# Patient Record
Sex: MALE | Race: WHITE | NOT HISPANIC OR LATINO | Employment: FULL TIME | URBAN - METROPOLITAN AREA
[De-identification: names, ages, dates, MRNs, and addresses within clinical notes are randomized per-mention and may not be internally consistent; named-entity substitution may affect disease eponyms.]

---

## 2023-02-12 ENCOUNTER — APPOINTMENT (EMERGENCY)
Dept: RADIOLOGY | Facility: HOSPITAL | Age: 43
End: 2023-02-12

## 2023-02-12 ENCOUNTER — HOSPITAL ENCOUNTER (EMERGENCY)
Facility: HOSPITAL | Age: 43
Discharge: HOME | End: 2023-02-12
Attending: EMERGENCY MEDICINE

## 2023-02-12 VITALS
DIASTOLIC BLOOD PRESSURE: 59 MMHG | RESPIRATION RATE: 16 BRPM | OXYGEN SATURATION: 96 % | SYSTOLIC BLOOD PRESSURE: 108 MMHG | TEMPERATURE: 98.2 F | WEIGHT: 225.09 LBS | HEART RATE: 71 BPM

## 2023-02-12 DIAGNOSIS — U07.1 COVID: Primary | ICD-10-CM

## 2023-02-12 LAB
ATRIAL RATE: 72 BPM
GLUCOSE SERPL-MCNC: 125 MG/DL (ref 65–140)
P AXIS: 47 DEGREES
PR INTERVAL: 144 MS
QRS AXIS: 76 DEGREES
QRSD INTERVAL: 90 MS
QT INTERVAL: 356 MS
QTC INTERVAL: 389 MS
T WAVE AXIS: 64 DEGREES
VENTRICULAR RATE: 72 BPM

## 2023-02-12 RX ORDER — BENZONATATE 100 MG/1
100 CAPSULE ORAL 3 TIMES DAILY PRN
Qty: 20 CAPSULE | Refills: 3 | Status: SHIPPED | OUTPATIENT
Start: 2023-02-12

## 2023-02-12 RX ORDER — BENZONATATE 100 MG/1
100 CAPSULE ORAL ONCE
Status: COMPLETED | OUTPATIENT
Start: 2023-02-12 | End: 2023-02-12

## 2023-02-12 RX ORDER — ALBUTEROL SULFATE 90 UG/1
2 AEROSOL, METERED RESPIRATORY (INHALATION) ONCE
Status: COMPLETED | OUTPATIENT
Start: 2023-02-12 | End: 2023-02-12

## 2023-02-12 RX ADMIN — ALBUTEROL SULFATE 2 PUFF: 90 AEROSOL, METERED RESPIRATORY (INHALATION) at 12:25

## 2023-02-12 RX ADMIN — BENZONATATE 100 MG: 100 CAPSULE ORAL at 12:25

## 2023-02-12 NOTE — DISCHARGE INSTRUCTIONS
Diagnosis; covid     - activity as tolerated- please keep as active as can     - keep well hydrated     - for any aches/pains- fevers- temp > 100 4/- over the counter generic tylenol 500 mg every 4 hrs while awake    - there is no magic cough medication- can try  tessalon 1 capsule 3 times per day  as needed -- can also try over the counter honey  5 ml at night 1 hr before bedtime    - albuterol  inhaler 2 puffs 4 times a d ay over the next several days to week     - please return to  the er for any increasing difficulty breathing in which you can not do your daily activities because you are too short of breath

## 2023-02-12 NOTE — Clinical Note
Latrell Downing was seen and treated in our emergency department on 2/12/2023  Diagnosis:     Helen Herbert  may return to work on return date  He may return on this date: 02/20/2023         If you have any questions or concerns, please don't hesitate to call        Brenda Danielson MD    ______________________________           _______________          _______________  Hospital Representative                              Date                                Time

## 2023-02-12 NOTE — ED PROVIDER NOTES
History  Chief Complaint   Patient presents with   • Shortness of Breath     + COVID test today  53-year-old male with past medical history of hypertension and diabetes presented for evaluation of shortness of breath and chest pain  Patient stated  he was not feeling well last night, took a home COVID test which was positive  This morning when he woke up, he reported having chest pain with deep breathing and with coughing  Patient denied nausea, vomiting, diarrhea,  Fever,  urinary symptoms or any constitutional symptoms  Patient stated the pain is nonpositional , nonexertional   Patient denies any trauma history or long car ride  He currently rates the pain 3 out of 10  Patient is seen at bedside in no acute distress, not currently short of breath  None       Past Medical History:   Diagnosis Date   • Diabetes mellitus ([facility] [address] )    • Hypertension        Past Surgical History:   Procedure Laterality Date   • CERVICAL DISC SURGERY     • TONSILLECTOMY         History reviewed  No pertinent family history  I have reviewed and agree with the history as documented  E-Cigarette/Vaping   • E-Cigarette Use Never User      E-Cigarette/Vaping Substances     Social History     Tobacco Use   • Smoking status: Every Day     Packs/day: 1 00     Types: Cigarettes   • Smokeless tobacco: Never   Vaping Use   • Vaping Use: Never used   Substance Use Topics   • Alcohol use: Never   • Drug use: Never        Review of Systems   Constitutional: Negative for chills and fever  HENT: Negative for ear pain and sore throat  Eyes: Negative for pain and visual disturbance  Respiratory: Positive for cough (Nonproductive) and shortness of breath (Resolved)  Cardiovascular: Negative for chest pain and palpitations  Gastrointestinal: Negative for abdominal pain and vomiting  Genitourinary: Negative for dysuria and hematuria  Musculoskeletal: Negative for arthralgias and back pain     Skin: Negative for color change and rash  Neurological: Negative for seizures and syncope  All other systems reviewed and are negative  Physical Exam  ED Triage Vitals [02/12/23 1038]   Temperature Pulse Respirations Blood Pressure SpO2   98 2 °F (36 8 °C) 75 16 149/69 98 %      Temp Source Heart Rate Source Patient Position - Orthostatic VS BP Location FiO2 (%)   Oral Monitor Sitting Right arm --      Pain Score       --             Orthostatic Vital Signs  Vitals:    02/12/23 1038 02/12/23 1100 02/12/23 1130 02/12/23 1200   BP: 149/69 107/58 108/54 108/59   Pulse: 75 71 71 71   Patient Position - Orthostatic VS: Sitting          Physical Exam  Vitals and nursing note reviewed  Constitutional:       General: He is not in acute distress  Appearance: He is well-developed  HENT:      Head: Normocephalic and atraumatic  Eyes:      Conjunctiva/sclera: Conjunctivae normal    Cardiovascular:      Rate and Rhythm: Normal rate and regular rhythm  Heart sounds: No murmur heard  Pulmonary:      Effort: Pulmonary effort is normal  No respiratory distress  Breath sounds: Rhonchi (Mild diffused) present  Abdominal:      Palpations: Abdomen is soft  Tenderness: There is no abdominal tenderness  Musculoskeletal:         General: No swelling  Cervical back: Neck supple  Skin:     General: Skin is warm and dry  Capillary Refill: Capillary refill takes less than 2 seconds  Neurological:      Mental Status: He is alert     Psychiatric:         Mood and Affect: Mood normal          ED Medications  Medications   benzonatate (TESSALON PERLES) capsule 100 mg (100 mg Oral Given 2/12/23 1225)   albuterol (PROVENTIL HFA,VENTOLIN HFA) inhaler 2 puff (2 puffs Inhalation Given 2/12/23 1225)       Diagnostic Studies  Results Reviewed     Procedure Component Value Units Date/Time    Fingerstick Glucose (POCT) [[id number]]  (Normal) Collected: 02/12/23 1220    Lab Status: Final result Updated: 02/12/23 1221     POC Glucose 125 mg/dl                  XR chest 1 view portable   ED Interpretation by Yael Soares MD (02/12 1239)   No signs of pneumothorax, pleural effusion or consolidation  No sign of rib fracture  Normal costovertebral angle  Final Result by Mable Palacio MD (02/12 1448)      No acute cardiopulmonary disease  Workstation performed: [id number]               Procedures  ECG 12 Lead Documentation Only    Date/Time: 2/12/2023 12:10 PM  Performed by: Yael Soares MD  Authorized by: Yael Soares MD     Indications / Diagnosis:  Shortness of breath/COVID  ECG reviewed by me, the ED Provider: yes    Patient location:  ED  Previous ECG:     Previous ECG:  Unavailable    Comparison to cardiac monitor: Yes    Interpretation:     Interpretation: normal    Rate:     ECG rate:  72    ECG rate assessment: normal    Rhythm:     Rhythm: sinus rhythm    Ectopy:     Ectopy: none    QRS:     QRS axis:  Normal    QRS intervals:  Normal  ST segments:     ST segments:  Normal  T waves:     T waves: normal    Comments:      Normal ventricular rate 72 bpm, FL interval 154 ms QRS duration 90 ms  ms QTc 389 ms  No acute ST segment elevation or depression, normal EKG  Sinus rhythm  ED Course  ED Course as of 02/12/23 2013   [place] Feb 12, 2023   156 40-year-old male presented for evaluation of shortness of breath after home COVID test was positive this morning  We will order EKG  1217 EKG showed normal sinus rhythm no acute ST segment elevation or depression  [phone] Order chest x-ray   1239 Chest x-ray unremarkable  There is no sign of pleural effusion, consolidation  Normal cardiac silhouette, no hemothorax or pneumothorax  No ribs fracture  1252 Patient given Tessalon pearls as well as albuterol inhaler for  breathing treatment     1319 After discussing risk and benefit of Paxlovid with patient, patient decided not interested with Paxlovid treatment at this point  Patient will be discharged home with return precaution  Medical Decision Making  15-year-old male presented for evaluation of shortness of breath after home COVID test was positive this morning  EKG showed normal sinus rhythm no acute ST segment elevation or depression  Order chest x-ray: Chest x-ray unremarkable  There is no sign of pleural effusion, consolidation  Normal cardiac silhouette, no hemothorax or pneumothorax  No ribs fracture  Differential diagnoses include COVID, flu, RSV, pneumonia among others    Patient given Tessalon pearls for cough  as well as albuterol inhaler for  breathing treatment  After discussing risk and benefit of Paxlovid with patient, patient decided not interested with Paxlovid treatment at this point  Patient discharged home with return precautions  COVID: acute illness or injury  Amount and/or Complexity of Data Reviewed  Labs: ordered  Radiology: ordered and independent interpretation performed  Risk  Prescription drug management  Disposition  Final diagnoses:   COVID     Time reflects when diagnosis was documented in both MDM as applicable and the Disposition within this note     Time User Action Codes Description Comment    2/12/2023  1:17 PM Jeromy De Los Santos [facility]U07 1[facility]       ED Disposition     ED Disposition   Discharge    Condition   Stable    Date/Time   Sun Feb 12, 2023  1:17 PM    Comment   Indra Lees discharge to home/self care  Follow-up Information    None         Discharge Medication List as of 2/12/2023  1:25 PM      START taking these medications    Details   benzonatate (TESSALON PERLES) 100 mg capsule Take 1 capsule (100 mg total) by mouth 3 (three) times a day as needed for cough for up to 20 doses, Starting Sun 2/12/2023, Print           No discharge procedures on file      PDMP Review     None           ED Provider  Attending physically available and evaluated Lorelei Abdullahi I managed the patient along with the ED Attending      Electronically Signed by         Trung Mendoza MD  02/12/23 2013

## 2023-02-15 NOTE — ED ATTENDING ATTESTATION
2/12/2023  ISilvia MD, saw and evaluated the patient  I have discussed the patient with the resident/non-physician practitioner and agree with the resident's/non-physician practitioner's findings, Plan of Care, and MDM as documented in the resident's/non-physician practitioner's note, except where noted  All available labs and Radiology studies were reviewed  I was present for key portions of any procedure(s) performed by the resident/non-physician practitioner and I was immediately available to provide assistance  At this point I agree with the current assessment done in the Emergency Department    I have conducted an independent evaluation of this patient a history and physical is as follows:see h nd p above- agree with er resident tx plan/ dispo    ED Course  ED Course as of 02/15/23 0851   Sun Feb 12, 2023   1251 Cxr portable- compared to previous- 5/10- no sign changes- no change in mediastinum/ cardiac silhouette- no free/sq air- no infiltrate- ptx- pulm edema- pleural effusions   1315 - er md note- discussed paxlovid with pt- risks / benefits-- pt not interested  in rx at [facility] point- will d/c   1316 Er md note- cxr reviewed with pt          Critical Care Time  Procedures

## 2024-09-20 LAB — HBA1C MFR BLD HPLC: 7.1 %

## 2024-11-15 ENCOUNTER — OFFICE VISIT (OUTPATIENT)
Dept: FAMILY MEDICINE CLINIC | Facility: CLINIC | Age: 44
End: 2024-11-15
Payer: COMMERCIAL

## 2024-11-15 VITALS
HEART RATE: 70 BPM | WEIGHT: 216.6 LBS | RESPIRATION RATE: 16 BRPM | TEMPERATURE: 96.8 F | BODY MASS INDEX: 32.83 KG/M2 | HEIGHT: 68 IN | DIASTOLIC BLOOD PRESSURE: 55 MMHG | OXYGEN SATURATION: 96 % | SYSTOLIC BLOOD PRESSURE: 124 MMHG

## 2024-11-15 DIAGNOSIS — J84.9 INTERSTITIAL LUNG DISEASE (HCC): ICD-10-CM

## 2024-11-15 DIAGNOSIS — H90.3 SENSORY HEARING LOSS, BILATERAL: ICD-10-CM

## 2024-11-15 DIAGNOSIS — E11.9 TYPE 2 DIABETES MELLITUS WITHOUT COMPLICATION, WITHOUT LONG-TERM CURRENT USE OF INSULIN (HCC): ICD-10-CM

## 2024-11-15 DIAGNOSIS — M72.2 PLANTAR FASCIITIS: ICD-10-CM

## 2024-11-15 DIAGNOSIS — F32.A ANXIETY AND DEPRESSION: ICD-10-CM

## 2024-11-15 DIAGNOSIS — Z00.00 ANNUAL PHYSICAL EXAM: Primary | ICD-10-CM

## 2024-11-15 DIAGNOSIS — G56.01 CARPAL TUNNEL SYNDROME OF RIGHT WRIST: ICD-10-CM

## 2024-11-15 DIAGNOSIS — F43.21 GRIEF: ICD-10-CM

## 2024-11-15 DIAGNOSIS — F41.8 INSOMNIA SECONDARY TO DEPRESSION WITH ANXIETY: ICD-10-CM

## 2024-11-15 DIAGNOSIS — F51.05 INSOMNIA SECONDARY TO DEPRESSION WITH ANXIETY: ICD-10-CM

## 2024-11-15 DIAGNOSIS — H93.13 TINNITUS OF BOTH EARS: ICD-10-CM

## 2024-11-15 DIAGNOSIS — E78.5 HYPERLIPIDEMIA, UNSPECIFIED HYPERLIPIDEMIA TYPE: ICD-10-CM

## 2024-11-15 DIAGNOSIS — F41.9 ANXIETY AND DEPRESSION: ICD-10-CM

## 2024-11-15 DIAGNOSIS — I10 ESSENTIAL HYPERTENSION: ICD-10-CM

## 2024-11-15 DIAGNOSIS — J43.8 PARASEPTAL EMPHYSEMA (HCC): ICD-10-CM

## 2024-11-15 PROBLEM — J44.9 COPD (CHRONIC OBSTRUCTIVE PULMONARY DISEASE) (HCC): Status: ACTIVE | Noted: 2024-03-13

## 2024-11-15 PROBLEM — M54.12 CERVICAL RADICULOPATHY: Status: ACTIVE | Noted: 2021-02-09

## 2024-11-15 PROBLEM — M54.41 ACUTE RIGHT-SIDED LOW BACK PAIN WITH RIGHT-SIDED SCIATICA: Status: ACTIVE | Noted: 2021-10-21

## 2024-11-15 PROCEDURE — 99204 OFFICE O/P NEW MOD 45 MIN: CPT | Performed by: FAMILY MEDICINE

## 2024-11-15 PROCEDURE — 99386 PREV VISIT NEW AGE 40-64: CPT | Performed by: FAMILY MEDICINE

## 2024-11-15 RX ORDER — MOMETASONE FUROATE 1 MG/ML
SOLUTION TOPICAL DAILY
COMMUNITY
Start: 2024-01-04 | End: 2025-01-03

## 2024-11-15 RX ORDER — ALBUTEROL SULFATE 0.83 MG/ML
2.5 SOLUTION RESPIRATORY (INHALATION) EVERY 6 HOURS PRN
COMMUNITY
Start: 2024-04-22 | End: 2025-04-22

## 2024-11-15 RX ORDER — ALBUTEROL SULFATE 90 UG/1
2 INHALANT RESPIRATORY (INHALATION) EVERY 6 HOURS PRN
COMMUNITY
Start: 2024-09-20

## 2024-11-15 RX ORDER — FLUTICASONE PROPIONATE 50 MCG
2 SPRAY, SUSPENSION (ML) NASAL DAILY
COMMUNITY
Start: 2024-10-25

## 2024-11-15 RX ORDER — LISINOPRIL AND HYDROCHLOROTHIAZIDE 12.5; 2 MG/1; MG/1
1 TABLET ORAL DAILY
Qty: 90 TABLET | Refills: 2 | Status: SHIPPED | OUTPATIENT
Start: 2024-11-15

## 2024-11-15 RX ORDER — ATORVASTATIN CALCIUM 40 MG/1
40 TABLET, FILM COATED ORAL DAILY
Qty: 90 TABLET | Refills: 2 | Status: SHIPPED | OUTPATIENT
Start: 2024-11-15

## 2024-11-15 RX ORDER — LISINOPRIL AND HYDROCHLOROTHIAZIDE 12.5; 2 MG/1; MG/1
1 TABLET ORAL DAILY
COMMUNITY
Start: 2024-08-28 | End: 2024-11-15 | Stop reason: SDUPTHER

## 2024-11-15 RX ORDER — BUPROPION HYDROCHLORIDE 150 MG/1
150 TABLET ORAL EVERY MORNING
Qty: 30 TABLET | Refills: 0 | Status: SHIPPED | OUTPATIENT
Start: 2024-11-15 | End: 2025-05-14

## 2024-11-15 RX ORDER — ATORVASTATIN CALCIUM 40 MG/1
40 TABLET, FILM COATED ORAL
COMMUNITY
Start: 2024-05-29 | End: 2024-11-15 | Stop reason: SDUPTHER

## 2024-11-15 NOTE — ASSESSMENT & PLAN NOTE
Lab Results   Component Value Date    HGBA1C 6.8 (H) 03/12/2024       Orders:    metFORMIN (GLUCOPHAGE) 500 mg tablet; Take 1 tablet (500 mg total) by mouth 2 (two) times a day with meals

## 2024-11-15 NOTE — ASSESSMENT & PLAN NOTE
Orders:    lisinopril-hydrochlorothiazide (PRINZIDE,ZESTORETIC) 20-12.5 MG per tablet; Take 1 tablet by mouth daily

## 2024-11-15 NOTE — PROGRESS NOTES
Adult Annual Physical  Name: Efrain Fair      : 1980      MRN: [id number]  Encounter Provider: Vikram Monroe MD  Encounter Date: 11/15/2024   Encounter department: [facility]    Efrain is a pleasant 43-year-old with type 2 diabetes, interstitial lung disease, COPD, hypertension, hyperlipidemia, hearing loss, tinnitus, chronic tobacco use, and chronic pain he has new patient with several concerns.  Patient unfortunately lost his father to complications of COPD and Ge[place]te.  He is grieving appropriately.  Does report feeling depressed.  He declined grief counseling.  We discussed medications to help his mood as well as reduce his smoking and help manage his weight.  We also addressed his chronic pain for which he tolerates.  I refilled his medications.  He is due for diabetic foot and eye exam which she will perform at the next appointment.  Last A1c was 6.8 and currently on metformin 500 mg twice daily.  Counseled on diet and exercise.  States he was able to lose weight in the past with lifestyle changes.  I refilled his medications including his blood pressure med which is managing his BP based on today's reading.  He has an appointment to establish with a new pulmonologist and denies any respiratory complaints.  Lastly I referred him to audiology for formal hearing evaluation through [facility].  States he had 1 with [facility] and was told that he had hearing impairment but did not have any follow-up after the initial evaluation.  Return to clinic in 6 to 8 weeks to reassess mood and to follow-up on diabetes.  Assessment & Plan  Annual physical exam         Essential hypertension    Orders:    lisinopril-hydrochlorothiazide (PRINZIDE,ZESTORETIC) 20-12.5 MG per tablet; Take 1 tablet by mouth daily    Hyperlipidemia, unspecified hyperlipidemia type    Orders:    atorvastatin (LIPITOR) 40 mg tablet; Take 1 tablet (40 mg total) by mouth daily    Type 2 diabetes mellitus without  complication, without long-term current use of insulin (HCC)    Lab Results   Component Value Date    HGBA1C 6.8 (H) 03/12/2024       Orders:    metFORMIN (GLUCOPHAGE) 500 mg tablet; Take 1 tablet (500 mg total) by mouth 2 (two) times a day with meals    Interstitial lung disease (HCC)         Paraseptal emphysema (HCC)         Carpal tunnel syndrome of right wrist         Plantar fasciitis         Sensory hearing loss, bilateral    Orders:    Ambulatory Referral to Audiology; Future    Tinnitus of both ears    Orders:    Ambulatory Referral to Audiology; Future    Grief         Anxiety and depression           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 Appropriate education was printed on patient's after visit summary.    Counseling:  Dental Health: discussed importance of regular tooth brushing, flossing, and dental visits.  Injury prevention: discussed safety/seat belts, safety helmets, smoke detectors, carbon monoxide detectors, and smoking near bedding or upholstery.  Exercise: the importance of regular exercise/physical activity was discussed. Recommend exercise 3-5 times per week for at least 30 minutes.       Depression Screening and Follow-up Plan: Patient was screened for depression during today's encounter. They screened negative with a PHQ-2 score of 2.    Tobacco Cessation Counseling: Tobacco cessation counseling was provided. The patient is sincerely urged to quit consumption of tobacco. He is not ready to quit tobacco. Medication options and side effects of medication discussed. Patient agreed to medication. Wellbutrin      History of Present Illness     Adult Annual Physical:  Patient presents for annual physical. New patient   Was seeing a provider through [facility] unfortunately passed  away this June   Reports arthritis in the right knee, neck, and back  Also noted to have degenerative changes in the spine  Traumatic injury to the right leg when he was 1 years old   Tightness and pain in the right lateral leg has affected his gait  .     Diet and Physical Activity:  - Diet/Nutrition:. acceptable but does snack and is emotionally eating  - Exercise: no formal exercise.    Depression Screening:  - PHQ-2 Score: 2    General Health:  - Sleep: sleeps well.  - Hearing: decreased hearing bilateral ears.  - Vision: wears glasses.  - Dental: regular dental visits. October     Health:  - History of STDs: no.   - Urinary symptoms: none.     Review of Systems  Medical History Reviewed by provider this encounter:  Tobacco     .  Past Medical History   Past Medical History:   Diagnosis Date    Diabetes mellitus (HCC)     Hypertension      Past Surgical History:   Procedure Laterality Date    CERVICAL DISC SURGERY  01/08/2000    TONSILLECTOMY       Family History   Problem Relation Age of Onset    Hypertension Mother     Diabetes Mother     Breast cancer Mother     Hypertension Father     Heart attack Father     Diabetes Father       reports that he has been smoking cigarettes. He has never used smokeless tobacco. He reports that he does not drink alcohol and does not use drugs.  Current Outpatient Medications on File Prior to Visit   Medication Sig Dispense Refill    albuterol (2.5 mg/3 mL) 0.083 % nebulizer solution Inhale 2.5 mg every 6 (six) hours as needed      albuterol (PROVENTIL HFA,VENTOLIN HFA) 90 mcg/act inhaler Inhale 2 puffs every 6 (six) hours as needed      fluticasone (FLONASE) 50 mcg/act nasal spray 2 sprays into each nostril daily      mometasone (ELOCON) 0.1 % lotion Apply topically daily       No current facility-administered medications on file prior to visit.     Allergies   Allergen Reactions    Penicillins Other (See Comments)     unknown      Current Outpatient Medications on File  "Prior to Visit   Medication Sig Dispense Refill    albuterol (2.5 mg/3 mL) 0.083 % nebulizer solution Inhale 2.5 mg every 6 (six) hours as needed      albuterol (PROVENTIL HFA,VENTOLIN HFA) 90 mcg/act inhaler Inhale 2 puffs every 6 (six) hours as needed      fluticasone (FLONASE) 50 mcg/act nasal spray 2 sprays into each nostril daily      mometasone (ELOCON) 0.1 % lotion Apply topically daily       No current facility-administered medications on file prior to visit.      Social History     Tobacco Use    Smoking status: Every Day     Current packs/day: 1.00     Types: Cigarettes    Smokeless tobacco: Never   Vaping Use    Vaping status: Never Used   Substance and Sexual Activity    Alcohol use: Never    Drug use: Never    Sexual activity: Not Currently       Objective   /55 (BP Location: Left arm, Patient Position: Sitting, Cuff Size: Large)   Pulse 70   Temp (!) 96.8 °F (36 °C) (Tympanic)   Resp 16   Ht 5' 8\" (1.727 m)   Wt 98.2 kg (216 lb 9.6 oz)   SpO2 96%   BMI 32.93 kg/m²     Physical Exam  Vitals reviewed.   Constitutional:       General: He is not in acute distress.     Appearance: Normal appearance. He is not ill-appearing or toxic-appearing.   HENT:      Head: Normocephalic and atraumatic.      Right Ear: Tympanic membrane normal.      Left Ear: Tympanic membrane normal.      Mouth/Throat:      Mouth: Mucous membranes are moist.   Eyes:      Extraocular Movements: Extraocular movements intact.   Cardiovascular:      Rate and Rhythm: Normal rate and regular rhythm.      Heart sounds: No murmur heard.  Pulmonary:      Effort: Pulmonary effort is normal. No respiratory distress.      Breath sounds: Normal breath sounds. No stridor. No wheezing, rhonchi or rales.   Abdominal:      General: There is no distension.      Palpations: Abdomen is soft. There is no mass.      Tenderness: There is no abdominal tenderness. There is no guarding or rebound.      Hernia: No hernia is present. "   Musculoskeletal:      Right lower leg: No edema.      Left lower leg: No edema.   Lymphadenopathy:      Cervical: No cervical adenopathy.   Skin:     General: Skin is warm.   Neurological:      Mental Status: He is alert and oriented to person, place, and time.   Psychiatric:         Attention and Perception: Attention normal.         Mood and Affect: Mood normal. Affect is flat.         Behavior: Behavior is withdrawn. Behavior is cooperative.         Cognition and Memory: Cognition and memory normal.     Administrative Statements   I have spent a total time of 45 minutes in caring for this patient on the day of the visit/encounter including Diagnostic results, Risks and benefits of tx options, Instructions for management, Patient and family education, Importance of tx compliance, Risk factor reductions, Impressions, Counseling / Coordination of care, Documenting in the medical record, Reviewing / ordering tests, medicine, procedures  , and Obtaining or reviewing history  .

## 2024-11-15 NOTE — PATIENT INSTRUCTIONS
Patient Education     Semaglutide (radha a GLOO tide)   Brand Names: US Ozempic (0.25 or 0.5 MG/DOSE); Ozempic (1 MG/DOSE); Ozempic (2 MG/DOSE); Rybelsus; Wegovy   Brand Names: Jae Ozempic (0.25 or 0.5 MG/DOSE); Ozempic (1 MG/DOSE); Rybelsus; Wegovy   Warning   This drug has been shown to cause thyroid cancer in some animals. It is not known if this happens in humans. If thyroid cancer happens, it may be deadly if not found and treated early. Call your doctor right away if you have a neck mass, trouble breathing, trouble swallowing, or have hoarseness that will not go away.  Do not use this drug if you have a health problem called Multiple Endocrine Neoplasia syndrome type 2 (MEN 2), or if you or a family member have had thyroid cancer.  What is this drug used for?   Ozempic prefilled pens and Rybelsus tablets:   It is used to lower blood sugar in people with type 2 diabetes.  Ozempic prefilled pens:   It is used to lower the chance of heart attack, stroke, and death in people with type 2 diabetes and heart disease.  Wegovy prefilled pens:   It is used to help with weight loss in certain people.  It is used to lower the chance of heart attack, stroke, and death in people who are overweight and have heart disease.  What do I need to tell my doctor BEFORE I take this drug?   For all uses of this drug:   If you are allergic to this drug; any part of this drug; or any other drugs, foods, or substances. Tell your doctor about the allergy and what signs you had.  If you have ever had pancreatitis.  If you have or have ever had depression or thoughts of suicide.  If you are using another drug that has the same drug in it.  If you are using another drug like this one. If you are not sure, ask your doctor or pharmacist.  If using for high blood sugar:   If you have type 1 diabetes. Do not use this drug to treat type 1 diabetes.  Tablets:   If you are breast-feeding. Do not breast-feed while you take this drug.  This is not  a list of all drugs or health problems that interact with this drug.  Tell your doctor and pharmacist about all of your drugs (prescription or OTC, natural products, vitamins) and health problems. You must check to make sure that it is safe for you to take this drug with all of your drugs and health problems. Do not start, stop, or change the dose of any drug without checking with your doctor.  What are some things I need to know or do while I take this drug?   For all uses of this drug:   Tell all of your health care providers that you take this drug. This includes your doctors, nurses, pharmacists, and dentists.  Follow the diet and workout plan that your doctor told you about.  Have blood work checked as you have been told by the doctor. Talk with the doctor.  Talk with your doctor before you drink alcohol.  Kidney problems have happened. Sometimes, these may need to be treated in the hospital or with dialysis.  If you cannot drink liquids by mouth or if you have upset stomach, throwing up, or diarrhea that does not go away, you need to avoid getting dehydrated. Contact your doctor to find out what to do. Dehydration may lead to low blood pressure or to new or worsening kidney problems.  Do not share pen or cartridge devices with another person even if the needle has been changed. Sharing these devices may pass infections from one person to another. This includes infections you may not know you have.  If you are planning on getting pregnant, talk with your doctor. You may need to stop taking this drug at least 2 months before getting pregnant.  If using for high blood sugar:   Wear disease medical alert ID (identification).  Check your blood sugar as you have been told by your doctor.  Do not drive if your blood sugar has been low. There is a greater chance of you having a crash.  People taking this drug with other drugs for diabetes may have a raised risk of low blood sugar. Very low blood sugar can lead to  seizures, passing out, long lasting brain damage, and sometimes death. Talk with the doctor.  It may be harder to control blood sugar during times of stress such as fever, infection, injury, or surgery. A change in physical activity, exercise, or diet may also affect blood sugar.  Tell your doctor if you are pregnant or may be pregnant.  Ozempic prefilled pens:   Tell your doctor if you are breast-feeding. You will need to talk about any risks to your baby.  Wegovy prefilled pens:   If you have high blood sugar (diabetes), you will need to watch your blood sugar closely.  If you are 75 or older, use this drug with care. You may have a raised chance of hip or pelvis fracture.  Weight loss during pregnancy may cause harm to the unborn baby. If you get pregnant while taking this drug or if you want to get pregnant, call your doctor right away.  Tell your doctor if you are breast-feeding. You will need to talk about any risks to your baby.  What are some side effects that I need to call my doctor about right away?   WARNING/CAUTION: Even though it may be rare, some people may have very bad and sometimes deadly side effects when taking a drug. Tell your doctor or get medical help right away if you have any of the following signs or symptoms that may be related to a very bad side effect:  For all uses of this drug:   Signs of an allergic reaction, like rash; hives; itching; red, swollen, blistered, or peeling skin with or without fever; wheezing; tightness in the chest or throat; trouble breathing, swallowing, or talking; unusual hoarseness; or swelling of the mouth, face, lips, tongue, or throat.  Signs of kidney problems like unable to pass urine, change in how much urine is passed, blood in the urine, or a big weight gain.  Signs of gallbladder problems like pain in the upper right belly area, right shoulder area, or between the shoulder blades; change in stools; dark urine or yellow skin or eyes; or fever with  chills.  Severe dizziness or passing out.  A fast heartbeat.  Change in eyesight.  Low blood sugar can happen. The chance may be raised when this drug is used with other drugs for diabetes. Signs may be dizziness, headache, feeling sleepy or weak, shaking, fast heartbeat, confusion, hunger, or sweating. Call your doctor right away if you have any of these signs. Follow what you have been told to do for low blood sugar. This may include taking glucose tablets, liquid glucose, or some fruit juices.  Severe and sometimes deadly pancreas problems (pancreatitis) have happened with this drug. Call your doctor right away if you have severe stomach pain, severe back pain, or severe upset stomach or throwing up.  Wegovy prefilled pens:   New or worse behavior or mood changes like depression or thoughts of suicide.  Pain in the hip or pelvis.  What are some other side effects of this drug?   All drugs may cause side effects. However, many people have no side effects or only have minor side effects. Call your doctor or get medical help if any of these side effects or any other side effects bother you or do not go away:  If using for high blood sugar:   Constipation, diarrhea, stomach pain, upset stomach, or throwing up.  Tablets:   Decreased appetite.  Wegovy prefilled pens:   Constipation, diarrhea, stomach pain, upset stomach, or throwing up.  Headache.  Feeling dizzy, tired, or weak.  Nose or throat irritation.  Runny nose.  Bloating.  Burping.  Gas.  Heartburn.  These are not all of the side effects that may occur. If you have questions about side effects, call your doctor. Call your doctor for medical advice about side effects.  You may report side effects to your national health agency.  You may report side effects to the FDA at 1-205.796.9631. You may also report side effects at https://www.fda.gov/medwatch.  How is this drug best taken?   Use this drug as ordered by your doctor. Read all information given to you.  Follow all instructions closely.  Tablets:   Take at least 30 minutes before the first food, drink, or drugs of the day.  Take with plain water only. Do not take with more than 4 ounces (120 mL) of water.  Swallow whole. Do not chew, break, or crush.  Keep taking this drug as you have been told by your doctor or other health care provider, even if you feel well.  Prefilled syringes or pens:   It is given as a shot into the fatty part of the skin on the top of the thigh, belly area, or upper arm.  If you will be giving yourself the shot, your doctor or nurse will teach you how to give the shot.  Take with or without food.  Take the same day each week.  Move the site where you give the shot with each shot.  Do not use if the solution is cloudy, leaking, or has particles.  This drug is clear and colorless. Do not use if the solution changes color.  Wash your hands before and after use.  Remove all pen needle covers before injecting a dose (there may be 2). If you are not sure what type of pen needle you have or how to use it, talk with the doctor.  Keep taking this drug as you have been told by your doctor or other health care provider, even if you feel well.  Throw away needles in a needle/sharp disposal box. Do not reuse needles or other items. When the box is full, follow all local rules for getting rid of it. Talk with a doctor or pharmacist if you have any questions.  If using for high blood sugar:   Attach new needle before each dose.  Take off the needle after each shot. Do not store this device with the needle on it.  Put the cap back on after you are done using your dose.  If you are also using insulin, you may inject this drug and the insulin in the same area of the body but not right next to each other.  Do not mix this drug in the same syringe with insulin.  This product may make a clicking sound as you prepare the dose. Do not prepare the dose by counting the clicks. Doing so could lead to using the wrong  dose.  Wegovy prefilled pens:   Each container is for one use only. Use right after opening. Throw away any part of the opened container after the dose is given.  Do not use this drug if it has been dropped or if it is broken.  Do not get this product wet.  What do I do if I miss a dose?   Tablets:   Skip the missed dose and go back to your normal time.  Do not take 2 doses at the same time or extra doses.  Prefilled syringes or pens:   Take a missed dose as soon as you think about it and go back to your normal time.  If it is less than 48 hours until your next dose, skip the missed and go back to your normal time.  Do not take 2 doses within 48 hours of each other.  If you miss 2 doses, call your doctor.  How do I store and/or throw out this drug?   Tablets:   Store in the original container at room temperature.  Store in a dry place. Do not store in a bathroom.  Prefilled syringes or pens:   Store unopened pens in a refrigerator. Do not freeze.  Do not use if it has been frozen.  Ozempic prefilled pens:   After opening, store in the refrigerator or at room temperature. Throw away any part not used after 56 days.  Protect from heat and light.  Wegovy prefilled pens:   You may store unopened containers at room temperature. If you store at room temperature, throw away any part not used after 28 days.  Store in the outer carton to protect from light.  Protect from heat.  All products:   Keep all drugs in a safe place. Keep all drugs out of the reach of children and pets.  Throw away unused or  drugs. Do not flush down a toilet or pour down a drain unless you are told to do so. Check with your pharmacist if you have questions about the best way to throw out drugs. There may be drug take-back programs in your area.  General drug facts   If your symptoms or health problems do not get better or if they become worse, call your doctor.  Do not share your drugs with others and do not take anyone else's drugs.  Some  drugs may have another patient information leaflet. If you have any questions about this drug, please talk with your doctor, nurse, pharmacist, or other health care provider.  This drug comes with an extra patient fact sheet called a Medication Guide. Read it with care. Read it again each time this drug is refilled. If you have any questions about this drug, please talk with the doctor, pharmacist, or other health care provider.  If you think there has been an overdose, call your poison control center or get medical care right away. Be ready to tell or show what was taken, how much, and when it happened.  Consumer Information Use and Disclaimer   This generalized information is a limited summary of diagnosis, treatment, and/or medication information. It is not meant to be comprehensive and should be used as a tool to help the user understand and/or assess potential diagnostic and treatment options. It does NOT include all information about conditions, treatments, medications, side effects, or risks that may apply to a specific patient. It is not intended to be medical advice or a substitute for the medical advice, diagnosis, or treatment of a health care provider based on the health care provider's examination and assessment of a patient's specific and unique circumstances. Patients must speak with a health care provider for complete information about their health, medical questions, and treatment options, including any risks or benefits regarding use of medications. This information does not endorse any treatments or medications as safe, effective, or approved for treating a specific patient. [employer] and its affiliates disclaim any warranty or liability relating to this information or the use thereof. The use of this information is governed by the Terms of Use, available at https://www.wolterskluwer.com/en/know/clinical-effectiveness-terms.  Last Reviewed Date   2024-04-19  Copyright   © 2024 [employer]  and its affiliates and/or licensors. All rights reserved.

## 2024-12-08 DIAGNOSIS — F51.05 INSOMNIA SECONDARY TO DEPRESSION WITH ANXIETY: ICD-10-CM

## 2024-12-08 DIAGNOSIS — F41.8 INSOMNIA SECONDARY TO DEPRESSION WITH ANXIETY: ICD-10-CM

## 2024-12-10 RX ORDER — BUPROPION HYDROCHLORIDE 150 MG/1
150 TABLET ORAL EVERY MORNING
Qty: 90 TABLET | Refills: 1 | Status: SHIPPED | OUTPATIENT
Start: 2024-12-10

## 2024-12-20 ENCOUNTER — TELEPHONE (OUTPATIENT)
Dept: ADMINISTRATIVE | Facility: OTHER | Age: 44
End: 2024-12-20

## 2024-12-20 NOTE — TELEPHONE ENCOUNTER
----- Message from Leonor COX sent at 12/19/2024  1:32 PM EST -----  Regarding: care gap request  12/19/24 1:32 PM    Hello, our patient attached above has had Hemoglobin A1c completed/performed. Please assist in updating the patient chart by pulling the Care Everywhere (CE) document. The date of service is 9/20/2024.     Thank you,  Leonor VARGAS

## 2024-12-20 NOTE — TELEPHONE ENCOUNTER
Upon review of the In Basket request we were able to locate, review, and update the patient chart as requested for Hemoglobin A1c.    Any additional questions or concerns should be emailed to the Practice Liaisons via the appropriate education email address, please do not reply via In Basket.    Thank you  Edita Casey MA   [facility]

## 2024-12-27 ENCOUNTER — RESULTS FOLLOW-UP (OUTPATIENT)
Dept: FAMILY MEDICINE CLINIC | Facility: CLINIC | Age: 44
End: 2024-12-27

## 2024-12-27 ENCOUNTER — OFFICE VISIT (OUTPATIENT)
Dept: FAMILY MEDICINE CLINIC | Facility: CLINIC | Age: 44
End: 2024-12-27
Payer: COMMERCIAL

## 2024-12-27 VITALS
TEMPERATURE: 97 F | RESPIRATION RATE: 16 BRPM | OXYGEN SATURATION: 97 % | HEIGHT: 68 IN | WEIGHT: 215.4 LBS | HEART RATE: 75 BPM | BODY MASS INDEX: 32.64 KG/M2 | DIASTOLIC BLOOD PRESSURE: 72 MMHG | SYSTOLIC BLOOD PRESSURE: 110 MMHG

## 2024-12-27 DIAGNOSIS — I10 ESSENTIAL HYPERTENSION: Primary | ICD-10-CM

## 2024-12-27 DIAGNOSIS — F32.A ANXIETY AND DEPRESSION: ICD-10-CM

## 2024-12-27 DIAGNOSIS — E11.9 TYPE 2 DIABETES MELLITUS WITHOUT COMPLICATION, WITHOUT LONG-TERM CURRENT USE OF INSULIN (HCC): ICD-10-CM

## 2024-12-27 DIAGNOSIS — Z12.11 COLON CANCER SCREENING: ICD-10-CM

## 2024-12-27 DIAGNOSIS — F41.9 ANXIETY AND DEPRESSION: ICD-10-CM

## 2024-12-27 LAB
LEFT EYE DIABETIC RETINOPATHY: NORMAL
LEFT EYE IMAGE QUALITY: NORMAL
LEFT EYE MACULAR EDEMA: NORMAL
LEFT EYE OTHER RETINOPATHY: NORMAL
RIGHT EYE DIABETIC RETINOPATHY: NORMAL
RIGHT EYE IMAGE QUALITY: NORMAL
RIGHT EYE MACULAR EDEMA: NORMAL
RIGHT EYE OTHER RETINOPATHY: NORMAL
SEVERITY (EYE EXAM): NORMAL
SL AMB POCT HEMOGLOBIN AIC: 6.9 (ref ?–6.5)

## 2024-12-27 PROCEDURE — 92250 FUNDUS PHOTOGRAPHY W/I&R: CPT | Performed by: FAMILY MEDICINE

## 2024-12-27 PROCEDURE — 83036 HEMOGLOBIN GLYCOSYLATED A1C: CPT | Performed by: FAMILY MEDICINE

## 2024-12-27 PROCEDURE — 99214 OFFICE O/P EST MOD 30 MIN: CPT | Performed by: FAMILY MEDICINE

## 2024-12-27 RX ORDER — SERTRALINE HYDROCHLORIDE 25 MG/1
25 TABLET, FILM COATED ORAL DAILY
Qty: 30 TABLET | Refills: 0 | Status: SHIPPED | OUTPATIENT
Start: 2024-12-27 | End: 2025-06-25

## 2024-12-27 NOTE — ASSESSMENT & PLAN NOTE
Bp at goal on lisinopril- HCTZ 20-12.5 mg daily   UTD for urine PCR this year  Last BMP wnl     Lab Results   Component Value Date    SODIUM 137 03/12/2024    K 4.5 03/12/2024    CL 99 (L) 03/12/2024    CO2 28 03/12/2024    BUN 14 03/12/2024    CREATININE 0.88 03/12/2024    GLUC 159 (H) 03/12/2024    CALCIUM 9.8 03/12/2024

## 2024-12-27 NOTE — PROGRESS NOTES
"Name: Efrain Fair      : 1980      MRN: [id number]  Encounter Provider: Vikram Monroe MD  Encounter Date: 2024   Encounter department: [facility]  :  Assessment & Plan  Essential hypertension  Bp at goal on lisinopril- HCTZ 20-12.5 mg daily   UTD for urine PCR this year  Last BMP wnl     Lab Results   Component Value Date    SODIUM 137 2024    K 4.5 2024    CL 99 (L) 2024    CO2 28 2024    BUN 14 2024    CREATININE 0.88 2024    GLUC 159 (H) 2024    CALCIUM 9.8 2024              Type 2 diabetes mellitus without complication, without long-term current use of insulin ([facility])    Lab Results   Component Value Date    HGBA1C 6.9 (A) 2024     6.9 down from 7.1!  Continue to limit the carbohydrates and sweets. Continue metformin 500 mg daily. Interested in ozempic is concerned about the cost. Will submit for PA. Aware of the side effects and titration schedule  Declined foot exam due to foot phobia. States he examines his feet and keeps them moisturized. States he would notify me if there were any concerns but would prefer not to examine them today  Did consent to diabetic eye exam which was done in the office   Declined flu and pneumonia vaccine today     Orders:    [facility] Diabetic eye exam    semaglutide, 0.25 or 0.5 mg/dose, (Ozempic, 0.25 or 0.5 MG/DOSE,) 2 mg/3 mL injection pen; Inject 0.375 mL (0.25 mg total) under the skin every 7 days    POCT hemoglobin A1c    Colon cancer screening  Plans to schedule an appt with GI        Anxiety and depression  Was on Wellbutrin and didn't notice an improvement. He shared today a remote history of epilepsy when he was a teenager and was told  he \" grew out of it\". This was not mentioned before nor documented in the PMH. Has not had seizures in years. Did explain that Wellbutrin and lower the seizure threshold. Will d/c Wellbutrin and start zoloft 25 mg daily. Asked to self titrate up to " "50 mg in 2 weeks if he gets partial response     Orders:    sertraline (ZOLOFT) 25 mg tablet; Take 1 tablet (25 mg total) by mouth daily           History of Present Illness     Here to follow up anxiety and DM   States he's felt the same on wellbutrin. No improvement in his mood   Shared a remote history of epilepsy when he was a teenager and told he grew out of it. No seizures since.   Concerned about his weight. Hard to exercise due to his back. Taking metformin 500 mg daily and when he increases the dose, he develops diarrhea.       Review of Systems    Objective   /72 (BP Location: Left arm, Patient Position: Sitting, Cuff Size: Large)   Pulse 75   Temp (!) 97 °F (36.1 °C) (Tympanic)   Resp 16   Ht 5' 8\" (1.727 m)   Wt 97.7 kg (215 lb 6.4 oz)   SpO2 97%   BMI 32.75 kg/m²      Physical Exam  Vitals reviewed.   Constitutional:       General: He is not in acute distress.     Appearance: Normal appearance. He is not ill-appearing.   Cardiovascular:      Rate and Rhythm: Normal rate and regular rhythm.      Heart sounds: No murmur heard.  Musculoskeletal:      Right lower leg: No edema.      Left lower leg: No edema.   Skin:     General: Skin is warm.   Neurological:      Mental Status: He is alert and oriented to person, place, and time.   Psychiatric:         Attention and Perception: Attention normal.         Mood and Affect: Mood is depressed.         Behavior: Behavior is withdrawn. Behavior is cooperative.         Cognition and Memory: Cognition and memory normal.         Judgment: Judgment normal.       "

## 2024-12-27 NOTE — ASSESSMENT & PLAN NOTE
Lab Results   Component Value Date    HGBA1C 6.9 (A) 12/27/2024     6.9 down from 7.1!  Continue to limit the carbohydrates and sweets. Continue metformin 500 mg daily. Interested in ozempic is concerned about the cost. Will submit for PA. Aware of the side effects and titration schedule  Declined foot exam due to foot phobia. States he examines his feet and keeps them moisturized. States he would notify me if there were any concerns but would prefer not to examine them today  Did consent to diabetic eye exam which was done in the office   Declined flu and pneumonia vaccine today     Orders:    IRIS Diabetic eye exam    semaglutide, 0.25 or 0.5 mg/dose, (Ozempic, 0.25 or 0.5 MG/DOSE,) 2 mg/3 mL injection pen; Inject 0.375 mL (0.25 mg total) under the skin every 7 days    POCT hemoglobin A1c

## 2025-01-07 ENCOUNTER — PATIENT MESSAGE (OUTPATIENT)
Dept: FAMILY MEDICINE CLINIC | Facility: CLINIC | Age: 45
End: 2025-01-07

## 2025-01-07 ENCOUNTER — OFFICE VISIT (OUTPATIENT)
Dept: FAMILY MEDICINE CLINIC | Facility: CLINIC | Age: 45
End: 2025-01-07
Payer: COMMERCIAL

## 2025-01-07 ENCOUNTER — NURSE TRIAGE (OUTPATIENT)
Age: 45
End: 2025-01-07

## 2025-01-07 VITALS
HEIGHT: 68 IN | OXYGEN SATURATION: 97 % | DIASTOLIC BLOOD PRESSURE: 70 MMHG | WEIGHT: 212.4 LBS | HEART RATE: 90 BPM | SYSTOLIC BLOOD PRESSURE: 112 MMHG | BODY MASS INDEX: 32.19 KG/M2 | TEMPERATURE: 96 F | RESPIRATION RATE: 16 BRPM

## 2025-01-07 DIAGNOSIS — Z12.11 SCREENING FOR COLON CANCER: ICD-10-CM

## 2025-01-07 DIAGNOSIS — E11.9 TYPE 2 DIABETES MELLITUS WITHOUT COMPLICATION, WITHOUT LONG-TERM CURRENT USE OF INSULIN (HCC): ICD-10-CM

## 2025-01-07 DIAGNOSIS — L60.8 NAIL DISCOLORATION: Primary | ICD-10-CM

## 2025-01-07 PROCEDURE — 99213 OFFICE O/P EST LOW 20 MIN: CPT | Performed by: FAMILY MEDICINE

## 2025-01-07 NOTE — TELEPHONE ENCOUNTER
"Reason for Disposition  • Diabetes mellitus and small cut (scratch) or abrasion (scrape)    Answer Assessment - Initial Assessment Questions  1. MECHANISM: \"How did the injury happen?\"       Patient denies injury   2. ONSET: \"When did the injury happen?\" (e.g., minutes, hours ago)       Toenail turned black yesterday.   3. LOCATION: \"What part of the toe is injured?\" \"Is the nail damaged?\"       Left big toe  4. APPEARANCE of TOE INJURY: \"What does the injury look like?\"       Black color   7. PAIN: \"Is there pain?\" If Yes, ask: \"How bad is the pain?\"   \"What does it keep you from doing?\" (Scale 0-10; or none, mild, moderate, severe)      No pain  10. OTHER SYMPTOMS: \"Do you have any other symptoms?\"         No other symptoms    Protocols used: Toe Injury-Adult-[place]    "

## 2025-01-07 NOTE — PROGRESS NOTES
Diabetic Foot Exam    Patient's shoes and socks removed.    Right Foot/Ankle   Right Foot Inspection  Skin Exam: skin normal, skin intact, callus and callus. No dry skin, no warmth, no erythema, no maceration, no abnormal color, no pre-ulcer and no ulcer.     Toe Exam: ROM and strength within normal limits.     Sensory   Monofilament testing: intact    Vascular  Capillary refills: < 3 seconds  The right DP pulse is 2+. The right PT pulse is 2+.     Left Foot/Ankle  Left Foot Inspection  Skin Exam: skin normal and skin intact. No dry skin, no warmth, no erythema, no maceration, normal color, no pre-ulcer, no ulcer and no callus.     Toe Exam: ROM and strength within normal limits.     Sensory   Monofilament testing: intact    Vascular  Capillary refills: < 3 seconds  The left DP pulse is 2+. The left PT pulse is 2+.     Assign Risk Category  No deformity present  No loss of protective sensation  No weak pulses  Risk: 0  Name: Efrain Fair      : 1980      MRN: [id number]  Encounter Provider: Adelina De La Paz DO  Encounter Date: 2025   Encounter department: [facility]    Assessment & Plan  Nail discoloration  Unsure if nail trauma bs underlying issue  Orders:  •  Ambulatory Referral to Podiatry; Future    Type 2 diabetes mellitus without complication, without long-term current use of insulin (HCC)  stable  Lab Results   Component Value Date    HGBA1C 6.9 (A) 2024            Screening for colon cancer    Orders:  •  Cologuard      Assessment & Plan         History of Present Illness     History of Present Illness  Here for left big toe issue  Unsure when it started  Dark area on the medial part  Did not recall trauma  Wears steel tip shoes for work       Review of Systems   Constitutional: Negative.    HENT: Negative.     Eyes: Negative.    Respiratory: Negative.     Cardiovascular: Negative.    Gastrointestinal: Negative.    Endocrine: Negative.    Genitourinary: Negative.   "  Musculoskeletal: Negative.    Allergic/Immunologic: Negative.    Neurological: Negative.    Hematological: Negative.    Psychiatric/Behavioral: Negative.       Objective   /70 (BP Location: Left arm, Patient Position: Sitting, Cuff Size: Large)   Pulse 90   Temp (!) 96 °F (35.6 °C) (Tympanic)   Resp 16   Ht 5' 8\" (1.727 m)   Wt 96.3 kg (212 lb 6.4 oz)   SpO2 97%   BMI 32.30 kg/m²     Physical Exam    Physical Exam  Cardiovascular:      Pulses: no weak pulses.           Dorsalis pedis pulses are 2+ on the right side and 2+ on the left side.        Posterior tibial pulses are 2+ on the right side and 2+ on the left side.   Feet:      Right foot:      Skin integrity: Callus present. No ulcer, skin breakdown, erythema, warmth or dry skin.      Left foot:      Skin integrity: No ulcer, skin breakdown, erythema, warmth, callus or dry skin.         "

## 2025-01-12 NOTE — ASSESSMENT & PLAN NOTE
44yM w/ ILD referred for a surgical biopsy. He follows with Dr. Sy of Pulmonology. Will proceed with a biopsy given the likelihood this will help with a more definitive understanding of the ILD.     Consent was obtained for a VATS right lung biopsy. Will plan for 3 targeted wedge biopsies from the right lung. Risks discussed included prolonged airleak requiring a chest tube to stay in place, bleeding, and infection.    Patient needs to stop smoking especially given upcoming surgery.   GLP1 to be held for 1 week before surgery.     Orders:  •  Ambulatory Referral to Thoracic Surgery  •  Case request operating room: THORACOSCOPY VIDEO ASSISTED SURGERY (VATS) RIGHT LUNG WEDGE RESECTIONS, BRONCHOSCOPY FLEXIBLE; Standing  •  Type and screen; Future  •  Basic metabolic panel; Future  •  CBC and Platelet; Future

## 2025-01-12 NOTE — PROGRESS NOTES
Name: Efrain Fair      : 1980      MRN: [id number]  Encounter Provider: Thien Gutierrez DO  Encounter Date: 2025   Encounter department: [facility] [place]  :  Assessment & Plan  Interstitial lung disease (HCC)  44yM w/ ILD referred for a surgical biopsy. He follows with Dr. Sy of Pulmonology. Will proceed with a biopsy given the likelihood this will help with a more definitive understanding of the ILD.     Consent was obtained for a VATS right lung biopsy. Will plan for 3 targeted wedge biopsies from the right lung. Risks discussed included prolonged airleak requiring a chest tube to stay in place, bleeding, and infection.    Patient needs to stop smoking especially given upcoming surgery.   GLP1 to be held for 1 week before surgery.     Orders:  •  Ambulatory Referral to Thoracic Surgery  •  Case request operating room: THORACOSCOPY VIDEO ASSISTED SURGERY (VATS) RIGHT LUNG WEDGE RESECTIONS, BRONCHOSCOPY FLEXIBLE; Standing  •  Type and screen; Future  •  Basic metabolic panel; Future  •  CBC and Platelet; Future        Thoracic History   Diagnosis: ILD    Problem   Interstitial Lung Disease (Hcc)        History of Present Illness   HPI  Efrain Fair is a 44 y.o. male with ILD who was referred by Dr. Sy of Pulmonology for a surgical biopsy. His medical history includes DM, COPD, MARBIN, and ILD. He is a current PPD smoker. He was recently started on Chantix. He believes he can quit for surgery. Previously had quit for 10d for neck surgery.     Data:  The following results contain my personal interpretation and summarization.   CTC (24): ILD w/ b/l inflammatory changes  PFTs (10/22/24): FEV1 89% and DLCO 86%    Review of Systems   Constitutional:  Negative for chills and fatigue.   HENT:  Negative for trouble swallowing and voice change.    Eyes:  Negative for photophobia and visual disturbance.   Respiratory:  Positive for shortness of breath. Negative  "for cough.    Cardiovascular:  Negative for chest pain and palpitations.   Gastrointestinal:  Negative for nausea and vomiting.   Musculoskeletal:  Negative for back pain and gait problem.   Skin:  Negative for rash and wound.   Neurological:  Negative for dizziness, weakness, light-headedness and headaches.           Objective   /79 (BP Location: Left arm, Patient Position: Sitting, Cuff Size: Large)   Pulse 98   Temp (!) 76 °F (24.4 °C) (Temporal)   Resp 15   Ht 5' 8\" (1.727 m)   Wt 96.1 kg (211 lb 13.8 oz)   SpO2 97%   BMI 32.21 kg/m²     Pain Screening:  Pain Score: 0-No pain  ECOG    Physical Exam  Constitutional:       Appearance: Normal appearance.   HENT:      Head: Normocephalic and atraumatic.   Cardiovascular:      Rate and Rhythm: Normal rate and regular rhythm.      Pulses: Normal pulses.      Heart sounds: Normal heart sounds.   Pulmonary:      Effort: Pulmonary effort is normal. No respiratory distress.      Breath sounds: Normal breath sounds.   Abdominal:      General: Abdomen is flat. There is no distension.      Palpations: Abdomen is soft.      Tenderness: There is no abdominal tenderness. There is no guarding.   Musculoskeletal:      Cervical back: Normal range of motion.      Right lower leg: No edema.      Left lower leg: No edema.   Lymphadenopathy:      Cervical: No cervical adenopathy.   Neurological:      General: No focal deficit present.      Mental Status: He is alert and oriented to person, place, and time.   Psychiatric:         Mood and Affect: Mood normal.         Behavior: Behavior normal.         Thought Content: Thought content normal.         Judgment: Judgment normal.         Labs:       Pathology: I have reviewed pathology reports described above.      "

## 2025-01-12 NOTE — H&P (VIEW-ONLY)
Name: Efrain Fair      : 1980      MRN: [id number]  Encounter Provider: Thien Gutierrez DO  Encounter Date: 2025   Encounter department: [facility] [place]  :  Assessment & Plan  Interstitial lung disease (HCC)  44yM w/ ILD referred for a surgical biopsy. He follows with Dr. Sy of Pulmonology. Will proceed with a biopsy given the likelihood this will help with a more definitive understanding of the ILD.     Consent was obtained for a VATS right lung biopsy. Will plan for 3 targeted wedge biopsies from the right lung. Risks discussed included prolonged airleak requiring a chest tube to stay in place, bleeding, and infection.    Patient needs to stop smoking especially given upcoming surgery.   GLP1 to be held for 1 week before surgery.     Orders:  •  Ambulatory Referral to Thoracic Surgery  •  Case request operating room: THORACOSCOPY VIDEO ASSISTED SURGERY (VATS) RIGHT LUNG WEDGE RESECTIONS, BRONCHOSCOPY FLEXIBLE; Standing  •  Type and screen; Future  •  Basic metabolic panel; Future  •  CBC and Platelet; Future        Thoracic History   Diagnosis: ILD    Problem   Interstitial Lung Disease (Hcc)        History of Present Illness   HPI  Efrain Fair is a 44 y.o. male with ILD who was referred by Dr. Sy of Pulmonology for a surgical biopsy. His medical history includes DM, COPD, MARBIN, and ILD. He is a current PPD smoker. He was recently started on Chantix. He believes he can quit for surgery. Previously had quit for 10d for neck surgery.     Data:  The following results contain my personal interpretation and summarization.   CTC (24): ILD w/ b/l inflammatory changes  PFTs (10/22/24): FEV1 89% and DLCO 86%    Review of Systems   Constitutional:  Negative for chills and fatigue.   HENT:  Negative for trouble swallowing and voice change.    Eyes:  Negative for photophobia and visual disturbance.   Respiratory:  Positive for shortness of breath. Negative  "for cough.    Cardiovascular:  Negative for chest pain and palpitations.   Gastrointestinal:  Negative for nausea and vomiting.   Musculoskeletal:  Negative for back pain and gait problem.   Skin:  Negative for rash and wound.   Neurological:  Negative for dizziness, weakness, light-headedness and headaches.           Objective   /79 (BP Location: Left arm, Patient Position: Sitting, Cuff Size: Large)   Pulse 98   Temp (!) 76 °F (24.4 °C) (Temporal)   Resp 15   Ht 5' 8\" (1.727 m)   Wt 96.1 kg (211 lb 13.8 oz)   SpO2 97%   BMI 32.21 kg/m²     Pain Screening:  Pain Score: 0-No pain  ECOG    Physical Exam  Constitutional:       Appearance: Normal appearance.   HENT:      Head: Normocephalic and atraumatic.   Cardiovascular:      Rate and Rhythm: Normal rate and regular rhythm.      Pulses: Normal pulses.      Heart sounds: Normal heart sounds.   Pulmonary:      Effort: Pulmonary effort is normal. No respiratory distress.      Breath sounds: Normal breath sounds.   Abdominal:      General: Abdomen is flat. There is no distension.      Palpations: Abdomen is soft.      Tenderness: There is no abdominal tenderness. There is no guarding.   Musculoskeletal:      Cervical back: Normal range of motion.      Right lower leg: No edema.      Left lower leg: No edema.   Lymphadenopathy:      Cervical: No cervical adenopathy.   Neurological:      General: No focal deficit present.      Mental Status: He is alert and oriented to person, place, and time.   Psychiatric:         Mood and Affect: Mood normal.         Behavior: Behavior normal.         Thought Content: Thought content normal.         Judgment: Judgment normal.         Labs:       Pathology: I have reviewed pathology reports described above.      "

## 2025-01-13 ENCOUNTER — PREP FOR PROCEDURE (OUTPATIENT)
Dept: CARDIAC SURGERY | Facility: CLINIC | Age: 45
End: 2025-01-13

## 2025-01-13 ENCOUNTER — CONSULT (OUTPATIENT)
Dept: CARDIAC SURGERY | Facility: CLINIC | Age: 45
End: 2025-01-13
Payer: COMMERCIAL

## 2025-01-13 VITALS
SYSTOLIC BLOOD PRESSURE: 110 MMHG | RESPIRATION RATE: 15 BRPM | HEIGHT: 68 IN | HEART RATE: 98 BPM | OXYGEN SATURATION: 97 % | WEIGHT: 211.86 LBS | BODY MASS INDEX: 32.11 KG/M2 | DIASTOLIC BLOOD PRESSURE: 79 MMHG | TEMPERATURE: 76 F

## 2025-01-13 DIAGNOSIS — J84.9 INTERSTITIAL LUNG DISEASE (HCC): Primary | ICD-10-CM

## 2025-01-13 PROCEDURE — 99245 OFF/OP CONSLTJ NEW/EST HI 55: CPT | Performed by: SURGERY

## 2025-01-13 RX ORDER — GABAPENTIN 300 MG/1
600 CAPSULE ORAL ONCE
OUTPATIENT
Start: 2025-01-13 | End: 2025-01-13

## 2025-01-13 RX ORDER — HEPARIN SODIUM 5000 [USP'U]/ML
5000 INJECTION, SOLUTION INTRAVENOUS; SUBCUTANEOUS
OUTPATIENT
Start: 2025-01-14 | End: 2025-01-15

## 2025-01-13 RX ORDER — CEFAZOLIN SODIUM 2 G/50ML
2000 SOLUTION INTRAVENOUS ONCE
OUTPATIENT
Start: 2025-01-13 | End: 2025-01-13

## 2025-01-13 RX ORDER — CELECOXIB 200 MG/1
200 CAPSULE ORAL ONCE
OUTPATIENT
Start: 2025-01-13 | End: 2025-01-13

## 2025-01-13 RX ORDER — ACETAMINOPHEN 325 MG/1
975 TABLET ORAL ONCE
OUTPATIENT
Start: 2025-01-13 | End: 2025-01-13

## 2025-01-19 DIAGNOSIS — F32.A ANXIETY AND DEPRESSION: ICD-10-CM

## 2025-01-19 DIAGNOSIS — F41.9 ANXIETY AND DEPRESSION: ICD-10-CM

## 2025-01-20 ENCOUNTER — APPOINTMENT (OUTPATIENT)
Dept: LAB | Facility: MEDICAL CENTER | Age: 45
End: 2025-01-20
Payer: COMMERCIAL

## 2025-01-20 ENCOUNTER — LAB REQUISITION (OUTPATIENT)
Dept: LAB | Facility: HOSPITAL | Age: 45
End: 2025-01-20
Payer: COMMERCIAL

## 2025-01-20 ENCOUNTER — RESULTS FOLLOW-UP (OUTPATIENT)
Dept: FAMILY MEDICINE CLINIC | Facility: CLINIC | Age: 45
End: 2025-01-20

## 2025-01-20 ENCOUNTER — PATIENT MESSAGE (OUTPATIENT)
Dept: FAMILY MEDICINE CLINIC | Facility: CLINIC | Age: 45
End: 2025-01-20

## 2025-01-20 DIAGNOSIS — J84.9 INTERSTITIAL PULMONARY DISEASE, UNSPECIFIED (HCC): ICD-10-CM

## 2025-01-20 DIAGNOSIS — E66.9 OBESITY (BMI 30-39.9): ICD-10-CM

## 2025-01-20 DIAGNOSIS — F32.A ANXIETY AND DEPRESSION: ICD-10-CM

## 2025-01-20 DIAGNOSIS — F41.9 ANXIETY AND DEPRESSION: ICD-10-CM

## 2025-01-20 DIAGNOSIS — J84.9 INTERSTITIAL LUNG DISEASE (HCC): ICD-10-CM

## 2025-01-20 DIAGNOSIS — E11.9 TYPE 2 DIABETES MELLITUS WITHOUT COMPLICATION, WITHOUT LONG-TERM CURRENT USE OF INSULIN (HCC): Primary | ICD-10-CM

## 2025-01-20 LAB
ABO GROUP BLD: NORMAL
ANION GAP SERPL CALCULATED.3IONS-SCNC: 8 MMOL/L (ref 4–13)
BLD GP AB SCN SERPL QL: NEGATIVE
BUN SERPL-MCNC: 12 MG/DL (ref 5–25)
CALCIUM SERPL-MCNC: 10.8 MG/DL (ref 8.4–10.2)
CHLORIDE SERPL-SCNC: 95 MMOL/L (ref 96–108)
CO2 SERPL-SCNC: 29 MMOL/L (ref 21–32)
COLOGUARD RESULT REPORTABLE: NEGATIVE
CREAT SERPL-MCNC: 0.99 MG/DL (ref 0.6–1.3)
ERYTHROCYTE [DISTWIDTH] IN BLOOD BY AUTOMATED COUNT: 11.9 % (ref 11.6–15.1)
GFR SERPL CREATININE-BSD FRML MDRD: 90 ML/MIN/1.73SQ M
GLUCOSE P FAST SERPL-MCNC: 137 MG/DL (ref 65–99)
HCT VFR BLD AUTO: 49 % (ref 36.5–49.3)
HGB BLD-MCNC: 16.4 G/DL (ref 12–17)
MCH RBC QN AUTO: 30.9 PG (ref 26.8–34.3)
MCHC RBC AUTO-ENTMCNC: 33.5 G/DL (ref 31.4–37.4)
MCV RBC AUTO: 92 FL (ref 82–98)
PLATELET # BLD AUTO: 211 THOUSANDS/UL (ref 149–390)
PMV BLD AUTO: 9.9 FL (ref 8.9–12.7)
POTASSIUM SERPL-SCNC: 4.6 MMOL/L (ref 3.5–5.3)
RBC # BLD AUTO: 5.31 MILLION/UL (ref 3.88–5.62)
RH BLD: POSITIVE
SODIUM SERPL-SCNC: 132 MMOL/L (ref 135–147)
SPECIMEN EXPIRATION DATE: NORMAL
WBC # BLD AUTO: 12.19 THOUSAND/UL (ref 4.31–10.16)

## 2025-01-20 PROCEDURE — 86901 BLOOD TYPING SEROLOGIC RH(D): CPT | Performed by: SURGERY

## 2025-01-20 PROCEDURE — 36415 COLL VENOUS BLD VENIPUNCTURE: CPT

## 2025-01-20 PROCEDURE — 80048 BASIC METABOLIC PNL TOTAL CA: CPT

## 2025-01-20 PROCEDURE — 85027 COMPLETE CBC AUTOMATED: CPT

## 2025-01-20 PROCEDURE — 86850 RBC ANTIBODY SCREEN: CPT | Performed by: SURGERY

## 2025-01-20 PROCEDURE — 86900 BLOOD TYPING SEROLOGIC ABO: CPT | Performed by: SURGERY

## 2025-01-20 RX ORDER — SERTRALINE HYDROCHLORIDE 25 MG/1
25 TABLET, FILM COATED ORAL DAILY
Qty: 30 TABLET | Refills: 0 | OUTPATIENT
Start: 2025-01-20 | End: 2025-07-19

## 2025-01-20 RX ORDER — SERTRALINE HYDROCHLORIDE 25 MG/1
25 TABLET, FILM COATED ORAL DAILY
Qty: 90 TABLET | Refills: 1 | Status: SHIPPED | OUTPATIENT
Start: 2025-01-20 | End: 2025-07-19

## 2025-01-20 RX ORDER — SERTRALINE HYDROCHLORIDE 25 MG/1
25 TABLET, FILM COATED ORAL DAILY
Qty: 90 TABLET | Refills: 1 | OUTPATIENT
Start: 2025-01-20

## 2025-01-21 ENCOUNTER — ANESTHESIA EVENT (OUTPATIENT)
Dept: PERIOP | Facility: HOSPITAL | Age: 45
End: 2025-01-21
Payer: COMMERCIAL

## 2025-01-21 NOTE — PRE-PROCEDURE INSTRUCTIONS
Pre-Surgery Instructions:   Medication Instructions    albuterol (2.5 mg/3 mL) 0.083 % nebulizer solution Uses PRN- OK to take day of surgery    albuterol (PROVENTIL HFA,VENTOLIN HFA) 90 mcg/act inhaler Uses PRN- OK to take day of surgery    atorvastatin (LIPITOR) 40 mg tablet Take night before surgery    fluticasone (FLONASE) 50 mcg/act nasal spray Take day of surgery.    lisinopril-hydrochlorothiazide (PRINZIDE,ZESTORETIC) 20-12.5 MG per tablet Hold day of surgery.    metFORMIN (GLUCOPHAGE) 500 mg tablet Hold day of surgery.    sertraline (ZOLOFT) 25 mg tablet Take day of surgery.    varenicline (CHANTIX) 0.5 mg tablet Take day of surgery.    Medication instructions for day surgery reviewed. Please use only a sip of water to take your instructed medications. Avoid all over the counter vitamins, supplements and NSAIDS for one week prior to surgery per anesthesia guidelines. Tylenol is ok to take as needed.     You will receive a call one business day prior to surgery with an arrival time and hospital directions. If your surgery is scheduled on a Monday, the hospital will be calling you on the Friday prior to your surgery. If you have not heard from anyone by 8pm, please call the hospital supervisor through the hospital  at [phone]. ([place] [phone] or [place] [phone]).    Do not eat or drink anything after midnight the night before your surgery, including candy, mints, lifesavers, or chewing gum. Do not drink alcohol 24hrs before your surgery. Try not to smoke at least 24hrs before your surgery.       Follow the pre surgery showering instructions as listed in the “My Surgical Experience Booklet” or otherwise provided by your surgeon's office. Do not use a blade to shave the surgical area 1 week before surgery. It is okay to use a clean electric clippers up to 24 hours before surgery. Do not apply any lotions, creams, including makeup, cologne, deodorant, or perfumes after showering on the  day of your surgery. Do not use dry shampoo, hair spray, hair gel, or any type of hair products.     No contact lenses, eye make-up, or artificial eyelashes. Remove nail polish, including gel polish, and any artificial, gel, or acrylic nails if possible. Remove all jewelry including rings and body piercing jewelry.     Wear causal clothing that is easy to take on and off. Consider your type of surgery.    Keep any valuables, jewelry, piercings at home. Please bring any specially ordered equipment (sling, braces) if indicated.    Arrange for a responsible person to drive you to and from the hospital on the day of your surgery. Please confirm the visitor policy for the day of your procedure when you receive your phone call with an arrival time.     Call the surgeon's office with any new illnesses, exposures, or additional questions prior to surgery.    Please reference your “My Surgical Experience Booklet” for additional information to prepare for your upcoming surgery.

## 2025-01-28 NOTE — ANESTHESIA PREPROCEDURE EVALUATION
Procedure:  THORACOSCOPY VIDEO ASSISTED SURGERY (VATS) RIGHT LUNG WEDGE RESECTIONS (Right: Chest)  BRONCHOSCOPY FLEXIBLE ([place])    Relevant Problems   CARDIO   (+) Essential hypertension   (+) Hyperlipidemia      ENDO   (+) Type 2 diabetes mellitus without complication, without long-term current use of insulin (HCC)      MUSCULOSKELETAL   (+) Acute right-sided low back pain with right-sided sciatica      PULMONARY   (+) COPD (chronic obstructive pulmonary disease) (HCC)      Behavioral Health   (+) Tobacco use disorder      Rheumatology   (+) Interstitial lung disease (HCC)      Orthopedic/Musculoskeletal   (+) Cervical radiculopathy      CMP: [id number]  CBC: wnl  EKG: SR & wnl    PFTs from [facility] as per pulmonary consult note (date?):    The FVC is normal.   The FEV1 is normal.   The FEV1/FVC is reduced.     No bronchodilator was administered.     The TLC is normal. The RV is increased [2.74L (168%)].     The DLCO is normal.     The Flow-Volume Loop demonstrates normal contour.     Impression:   The patient has evidence of mild obstruction. The flow volume curve   demonstrates no discernible pathology.  There is evidence of air trapping   with out hyperinflation.  The diffusion capacity is normal.     Compared to prior study from 9/20/23, FVC has increased 14%, FEV1 has   increased 18%, TLC has increased 9% and DLCO has increased.     Findings are consistent with hyperinflation.   Physical Exam    Airway    Mallampati score: II  TM Distance: >3 FB  Neck ROM: full     Dental       Cardiovascular      Pulmonary      Other Findings    Anesthesia Plan  ASA Score- 3     Anesthesia Type- general with ASA Monitors.         Additional Monitors: arterial line.    Airway Plan: ETT.           Plan Factors-    Chart reviewed. EKG reviewed.  Existing labs reviewed. Patient summary reviewed.    Patient is a current smoker.  Patient did not smoke on day of surgery.            Induction- intravenous.    Postoperative Plan- Plan for  postoperative opioid use. Planned trial extubation    Perioperative Resuscitation Plan - Level 1 - Full Code.       Informed Consent- Anesthetic plan and risks discussed with patient.  I personally reviewed this patient with the CRNA. Discussed and agreed on the Anesthesia Plan with the CRNA..      NPO Status:  No vitals data found for the desired time range.

## 2025-01-29 ENCOUNTER — ANESTHESIA (OUTPATIENT)
Dept: PERIOP | Facility: HOSPITAL | Age: 45
End: 2025-01-29
Payer: COMMERCIAL

## 2025-01-29 ENCOUNTER — HOSPITAL ENCOUNTER (INPATIENT)
Facility: HOSPITAL | Age: 45
LOS: 1 days | Discharge: HOME | DRG: 168 | End: 2025-01-30
Attending: SURGERY | Admitting: SURGERY
Payer: COMMERCIAL

## 2025-01-29 ENCOUNTER — APPOINTMENT (INPATIENT)
Dept: RADIOLOGY | Facility: HOSPITAL | Age: 45
DRG: 168 | End: 2025-01-29
Payer: COMMERCIAL

## 2025-01-29 DIAGNOSIS — J84.9 INTERSTITIAL LUNG DISEASE (HCC): ICD-10-CM

## 2025-01-29 LAB
ANION GAP SERPL CALCULATED.3IONS-SCNC: 5 MMOL/L (ref 4–13)
BUN SERPL-MCNC: 13 MG/DL (ref 5–25)
CALCIUM SERPL-MCNC: 8.5 MG/DL (ref 8.4–10.2)
CHLORIDE SERPL-SCNC: 104 MMOL/L (ref 96–108)
CO2 SERPL-SCNC: 26 MMOL/L (ref 21–32)
CREAT SERPL-MCNC: 0.81 MG/DL (ref 0.6–1.3)
ERYTHROCYTE [DISTWIDTH] IN BLOOD BY AUTOMATED COUNT: 11.9 % (ref 11.6–15.1)
GFR SERPL CREATININE-BSD FRML MDRD: 106 ML/MIN/1.73SQ M
GLUCOSE SERPL-MCNC: 100 MG/DL (ref 65–140)
GLUCOSE SERPL-MCNC: 108 MG/DL (ref 65–140)
GLUCOSE SERPL-MCNC: 198 MG/DL (ref 65–140)
GLUCOSE SERPL-MCNC: 215 MG/DL (ref 65–140)
GLUCOSE SERPL-MCNC: 231 MG/DL (ref 65–140)
HCT VFR BLD AUTO: 40 % (ref 36.5–49.3)
HGB BLD-MCNC: 13.7 G/DL (ref 12–17)
MAGNESIUM SERPL-MCNC: 1.8 MG/DL (ref 1.9–2.7)
MCH RBC QN AUTO: 31.1 PG (ref 26.8–34.3)
MCHC RBC AUTO-ENTMCNC: 34.3 G/DL (ref 31.4–37.4)
MCV RBC AUTO: 91 FL (ref 82–98)
PLATELET # BLD AUTO: 142 THOUSANDS/UL (ref 149–390)
PMV BLD AUTO: 9.7 FL (ref 8.9–12.7)
POTASSIUM SERPL-SCNC: 4.1 MMOL/L (ref 3.5–5.3)
RBC # BLD AUTO: 4.41 MILLION/UL (ref 3.88–5.62)
SODIUM SERPL-SCNC: 135 MMOL/L (ref 135–147)
WBC # BLD AUTO: 6.47 THOUSAND/UL (ref 4.31–10.16)

## 2025-01-29 PROCEDURE — 87205 SMEAR GRAM STAIN: CPT | Performed by: SURGERY

## 2025-01-29 PROCEDURE — 0BB54ZX EXCISION OF RIGHT MIDDLE LOBE BRONCHUS, PERCUTANEOUS ENDOSCOPIC APPROACH, DIAGNOSTIC: ICD-10-PCS | Performed by: SURGERY

## 2025-01-29 PROCEDURE — 87206 SMEAR FLUORESCENT/ACID STAI: CPT | Performed by: SURGERY

## 2025-01-29 PROCEDURE — 85027 COMPLETE CBC AUTOMATED: CPT

## 2025-01-29 PROCEDURE — 87252 VIRUS INOCULATION TISSUE: CPT | Performed by: SURGERY

## 2025-01-29 PROCEDURE — 80048 BASIC METABOLIC PNL TOTAL CA: CPT

## 2025-01-29 PROCEDURE — 87070 CULTURE OTHR SPECIMN AEROBIC: CPT | Performed by: SURGERY

## 2025-01-29 PROCEDURE — 88307 TISSUE EXAM BY PATHOLOGIST: CPT | Performed by: PATHOLOGY

## 2025-01-29 PROCEDURE — 87176 TISSUE HOMOGENIZATION CULTR: CPT | Performed by: SURGERY

## 2025-01-29 PROCEDURE — 71045 X-RAY EXAM CHEST 1 VIEW: CPT

## 2025-01-29 PROCEDURE — 88342 IMHCHEM/IMCYTCHM 1ST ANTB: CPT | Performed by: PATHOLOGY

## 2025-01-29 PROCEDURE — 83735 ASSAY OF MAGNESIUM: CPT

## 2025-01-29 PROCEDURE — 82948 REAGENT STRIP/BLOOD GLUCOSE: CPT

## 2025-01-29 PROCEDURE — 88341 IMHCHEM/IMCYTCHM EA ADD ANTB: CPT | Performed by: PATHOLOGY

## 2025-01-29 PROCEDURE — 32607 THORACOSCOPY W/BX INFILTRATE: CPT | Performed by: SURGERY

## 2025-01-29 PROCEDURE — 94664 DEMO&/EVAL PT USE INHALER: CPT

## 2025-01-29 PROCEDURE — 87075 CULTR BACTERIA EXCEPT BLOOD: CPT | Performed by: SURGERY

## 2025-01-29 PROCEDURE — 87102 FUNGUS ISOLATION CULTURE: CPT | Performed by: SURGERY

## 2025-01-29 PROCEDURE — 0BBF4ZX EXCISION OF RIGHT LOWER LUNG LOBE, PERCUTANEOUS ENDOSCOPIC APPROACH, DIAGNOSTIC: ICD-10-PCS | Performed by: SURGERY

## 2025-01-29 PROCEDURE — 87116 MYCOBACTERIA CULTURE: CPT | Performed by: SURGERY

## 2025-01-29 PROCEDURE — 0BB44ZX EXCISION OF RIGHT UPPER LOBE BRONCHUS, PERCUTANEOUS ENDOSCOPIC APPROACH, DIAGNOSTIC: ICD-10-PCS | Performed by: SURGERY

## 2025-01-29 RX ORDER — FENTANYL CITRATE 50 UG/ML
INJECTION, SOLUTION INTRAMUSCULAR; INTRAVENOUS AS NEEDED
Status: DISCONTINUED | OUTPATIENT
Start: 2025-01-29 | End: 2025-01-29

## 2025-01-29 RX ORDER — GABAPENTIN 300 MG/1
600 CAPSULE ORAL ONCE
Status: COMPLETED | OUTPATIENT
Start: 2025-01-29 | End: 2025-01-29

## 2025-01-29 RX ORDER — ONDANSETRON 2 MG/ML
INJECTION INTRAMUSCULAR; INTRAVENOUS AS NEEDED
Status: DISCONTINUED | OUTPATIENT
Start: 2025-01-29 | End: 2025-01-29

## 2025-01-29 RX ORDER — ACETAMINOPHEN 325 MG/1
975 TABLET ORAL ONCE
Status: COMPLETED | OUTPATIENT
Start: 2025-01-29 | End: 2025-01-29

## 2025-01-29 RX ORDER — PROPOFOL 10 MG/ML
INJECTION, EMULSION INTRAVENOUS CONTINUOUS PRN
Status: DISCONTINUED | OUTPATIENT
Start: 2025-01-29 | End: 2025-01-29

## 2025-01-29 RX ORDER — HYDROMORPHONE HCL IN WATER/PF 6 MG/30 ML
0.2 PATIENT CONTROLLED ANALGESIA SYRINGE INTRAVENOUS
Status: DISCONTINUED | OUTPATIENT
Start: 2025-01-29 | End: 2025-01-29 | Stop reason: HOSPADM

## 2025-01-29 RX ORDER — SODIUM CHLORIDE, SODIUM LACTATE, POTASSIUM CHLORIDE, CALCIUM CHLORIDE 600; 310; 30; 20 MG/100ML; MG/100ML; MG/100ML; MG/100ML
125 INJECTION, SOLUTION INTRAVENOUS CONTINUOUS
Status: DISCONTINUED | OUTPATIENT
Start: 2025-01-29 | End: 2025-01-29

## 2025-01-29 RX ORDER — SENNOSIDES 8.6 MG
1 TABLET ORAL DAILY
Status: DISCONTINUED | OUTPATIENT
Start: 2025-01-29 | End: 2025-01-30 | Stop reason: HOSPADM

## 2025-01-29 RX ORDER — SODIUM CHLORIDE 9 MG/ML
INJECTION, SOLUTION INTRAVENOUS CONTINUOUS PRN
Status: DISCONTINUED | OUTPATIENT
Start: 2025-01-29 | End: 2025-01-29

## 2025-01-29 RX ORDER — LIDOCAINE HYDROCHLORIDE 10 MG/ML
INJECTION, SOLUTION EPIDURAL; INFILTRATION; INTRACAUDAL; PERINEURAL AS NEEDED
Status: DISCONTINUED | OUTPATIENT
Start: 2025-01-29 | End: 2025-01-29

## 2025-01-29 RX ORDER — HEPARIN SODIUM 5000 [USP'U]/ML
5000 INJECTION, SOLUTION INTRAVENOUS; SUBCUTANEOUS
Status: COMPLETED | OUTPATIENT
Start: 2025-01-29 | End: 2025-01-29

## 2025-01-29 RX ORDER — FLUTICASONE PROPIONATE 50 MCG
2 SPRAY, SUSPENSION (ML) NASAL DAILY
Status: DISCONTINUED | OUTPATIENT
Start: 2025-01-29 | End: 2025-01-30 | Stop reason: HOSPADM

## 2025-01-29 RX ORDER — CLINDAMYCIN PHOSPHATE 900 MG/50ML
INJECTION, SOLUTION INTRAVENOUS AS NEEDED
Status: DISCONTINUED | OUTPATIENT
Start: 2025-01-29 | End: 2025-01-29

## 2025-01-29 RX ORDER — ALBUTEROL SULFATE 0.83 MG/ML
2.5 SOLUTION RESPIRATORY (INHALATION) EVERY 6 HOURS PRN
Status: DISCONTINUED | OUTPATIENT
Start: 2025-01-29 | End: 2025-01-29

## 2025-01-29 RX ORDER — ATORVASTATIN CALCIUM 40 MG/1
40 TABLET, FILM COATED ORAL
Status: DISCONTINUED | OUTPATIENT
Start: 2025-01-29 | End: 2025-01-30 | Stop reason: HOSPADM

## 2025-01-29 RX ORDER — INSULIN LISPRO 100 [IU]/ML
1-6 INJECTION, SOLUTION INTRAVENOUS; SUBCUTANEOUS
Status: DISCONTINUED | OUTPATIENT
Start: 2025-01-29 | End: 2025-01-30 | Stop reason: HOSPADM

## 2025-01-29 RX ORDER — FAMOTIDINE 20 MG/1
20 TABLET, FILM COATED ORAL 2 TIMES DAILY
Status: DISCONTINUED | OUTPATIENT
Start: 2025-01-29 | End: 2025-01-30 | Stop reason: HOSPADM

## 2025-01-29 RX ORDER — POLYETHYLENE GLYCOL 3350 17 G/17G
17 POWDER, FOR SOLUTION ORAL DAILY PRN
Status: DISCONTINUED | OUTPATIENT
Start: 2025-01-29 | End: 2025-01-30 | Stop reason: HOSPADM

## 2025-01-29 RX ORDER — DEXAMETHASONE SODIUM PHOSPHATE 10 MG/ML
INJECTION, SOLUTION INTRAMUSCULAR; INTRAVENOUS AS NEEDED
Status: DISCONTINUED | OUTPATIENT
Start: 2025-01-29 | End: 2025-01-29

## 2025-01-29 RX ORDER — CELECOXIB 100 MG/1
100 CAPSULE ORAL 2 TIMES DAILY
Status: DISCONTINUED | OUTPATIENT
Start: 2025-01-29 | End: 2025-01-30 | Stop reason: HOSPADM

## 2025-01-29 RX ORDER — ALBUTEROL SULFATE 90 UG/1
2 INHALANT RESPIRATORY (INHALATION) EVERY 6 HOURS PRN
Status: DISCONTINUED | OUTPATIENT
Start: 2025-01-29 | End: 2025-01-30 | Stop reason: HOSPADM

## 2025-01-29 RX ORDER — SODIUM CHLORIDE, SODIUM LACTATE, POTASSIUM CHLORIDE, CALCIUM CHLORIDE 600; 310; 30; 20 MG/100ML; MG/100ML; MG/100ML; MG/100ML
60 INJECTION, SOLUTION INTRAVENOUS CONTINUOUS
Status: DISCONTINUED | OUTPATIENT
Start: 2025-01-29 | End: 2025-01-30

## 2025-01-29 RX ORDER — FENTANYL CITRATE/PF 50 MCG/ML
50 SYRINGE (ML) INJECTION
Refills: 0 | Status: DISCONTINUED | OUTPATIENT
Start: 2025-01-29 | End: 2025-01-29 | Stop reason: HOSPADM

## 2025-01-29 RX ORDER — SERTRALINE HYDROCHLORIDE 25 MG/1
25 TABLET, FILM COATED ORAL DAILY
Status: DISCONTINUED | OUTPATIENT
Start: 2025-01-29 | End: 2025-01-30 | Stop reason: HOSPADM

## 2025-01-29 RX ORDER — CELECOXIB 200 MG/1
200 CAPSULE ORAL ONCE
Status: COMPLETED | OUTPATIENT
Start: 2025-01-29 | End: 2025-01-29

## 2025-01-29 RX ORDER — LABETALOL HYDROCHLORIDE 5 MG/ML
10 INJECTION, SOLUTION INTRAVENOUS EVERY 6 HOURS PRN
Status: DISCONTINUED | OUTPATIENT
Start: 2025-01-29 | End: 2025-01-30 | Stop reason: HOSPADM

## 2025-01-29 RX ORDER — ONDANSETRON 2 MG/ML
4 INJECTION INTRAMUSCULAR; INTRAVENOUS EVERY 6 HOURS PRN
Status: DISCONTINUED | OUTPATIENT
Start: 2025-01-29 | End: 2025-01-30 | Stop reason: HOSPADM

## 2025-01-29 RX ORDER — GABAPENTIN 300 MG/1
300 CAPSULE ORAL 3 TIMES DAILY
Status: DISCONTINUED | OUTPATIENT
Start: 2025-01-29 | End: 2025-01-30 | Stop reason: HOSPADM

## 2025-01-29 RX ORDER — CEFAZOLIN SODIUM 2 G/50ML
2000 SOLUTION INTRAVENOUS ONCE
Status: DISCONTINUED | OUTPATIENT
Start: 2025-01-29 | End: 2025-01-29 | Stop reason: HOSPADM

## 2025-01-29 RX ORDER — PROPOFOL 10 MG/ML
INJECTION, EMULSION INTRAVENOUS AS NEEDED
Status: DISCONTINUED | OUTPATIENT
Start: 2025-01-29 | End: 2025-01-29

## 2025-01-29 RX ORDER — HYDROMORPHONE HCL/PF 1 MG/ML
0.5 SYRINGE (ML) INJECTION
Status: DISCONTINUED | OUTPATIENT
Start: 2025-01-29 | End: 2025-01-30 | Stop reason: HOSPADM

## 2025-01-29 RX ORDER — OXYCODONE HYDROCHLORIDE 5 MG/1
2.5 TABLET ORAL EVERY 4 HOURS PRN
Refills: 0 | Status: DISCONTINUED | OUTPATIENT
Start: 2025-01-29 | End: 2025-01-30 | Stop reason: HOSPADM

## 2025-01-29 RX ORDER — ALBUTEROL SULFATE 90 UG/1
2 INHALANT RESPIRATORY (INHALATION)
Status: DISCONTINUED | OUTPATIENT
Start: 2025-01-29 | End: 2025-01-30 | Stop reason: HOSPADM

## 2025-01-29 RX ORDER — NICOTINE 21 MG/24HR
14 PATCH, TRANSDERMAL 24 HOURS TRANSDERMAL DAILY
Status: DISCONTINUED | OUTPATIENT
Start: 2025-01-29 | End: 2025-01-30 | Stop reason: HOSPADM

## 2025-01-29 RX ORDER — HEPARIN SODIUM 5000 [USP'U]/ML
5000 INJECTION, SOLUTION INTRAVENOUS; SUBCUTANEOUS EVERY 8 HOURS SCHEDULED
Status: DISCONTINUED | OUTPATIENT
Start: 2025-01-29 | End: 2025-01-30 | Stop reason: HOSPADM

## 2025-01-29 RX ORDER — DOCUSATE SODIUM 100 MG/1
100 CAPSULE, LIQUID FILLED ORAL 2 TIMES DAILY
Status: DISCONTINUED | OUTPATIENT
Start: 2025-01-29 | End: 2025-01-30 | Stop reason: HOSPADM

## 2025-01-29 RX ORDER — MIDAZOLAM HYDROCHLORIDE 2 MG/2ML
INJECTION, SOLUTION INTRAMUSCULAR; INTRAVENOUS AS NEEDED
Status: DISCONTINUED | OUTPATIENT
Start: 2025-01-29 | End: 2025-01-29

## 2025-01-29 RX ORDER — ALBUTEROL SULFATE 0.83 MG/ML
2.5 SOLUTION RESPIRATORY (INHALATION) ONCE AS NEEDED
Status: DISCONTINUED | OUTPATIENT
Start: 2025-01-29 | End: 2025-01-29 | Stop reason: HOSPADM

## 2025-01-29 RX ORDER — ONDANSETRON 2 MG/ML
4 INJECTION INTRAMUSCULAR; INTRAVENOUS ONCE AS NEEDED
Status: DISCONTINUED | OUTPATIENT
Start: 2025-01-29 | End: 2025-01-29 | Stop reason: HOSPADM

## 2025-01-29 RX ORDER — ROCURONIUM BROMIDE 10 MG/ML
INJECTION, SOLUTION INTRAVENOUS AS NEEDED
Status: DISCONTINUED | OUTPATIENT
Start: 2025-01-29 | End: 2025-01-29

## 2025-01-29 RX ORDER — SUCCINYLCHOLINE/SOD CL,ISO/PF 100 MG/5ML
SYRINGE (ML) INTRAVENOUS AS NEEDED
Status: DISCONTINUED | OUTPATIENT
Start: 2025-01-29 | End: 2025-01-29

## 2025-01-29 RX ORDER — OXYCODONE HYDROCHLORIDE 5 MG/1
5 TABLET ORAL EVERY 4 HOURS PRN
Refills: 0 | Status: DISCONTINUED | OUTPATIENT
Start: 2025-01-29 | End: 2025-01-30 | Stop reason: HOSPADM

## 2025-01-29 RX ORDER — ACETAMINOPHEN 325 MG/1
975 TABLET ORAL EVERY 6 HOURS
Status: DISCONTINUED | OUTPATIENT
Start: 2025-01-29 | End: 2025-01-30 | Stop reason: HOSPADM

## 2025-01-29 RX ADMIN — Medication 200 MG: at 09:59

## 2025-01-29 RX ADMIN — GABAPENTIN 600 MG: 300 CAPSULE ORAL at 08:54

## 2025-01-29 RX ADMIN — PHENYLEPHRINE HYDROCHLORIDE 50 MCG/MIN: 10 INJECTION INTRAVENOUS at 10:26

## 2025-01-29 RX ADMIN — HEPARIN SODIUM 5000 UNITS: 5000 INJECTION, SOLUTION INTRAVENOUS; SUBCUTANEOUS at 21:14

## 2025-01-29 RX ADMIN — ATORVASTATIN CALCIUM 40 MG: 40 TABLET, FILM COATED ORAL at 21:14

## 2025-01-29 RX ADMIN — HYDROMORPHONE HYDROCHLORIDE 0.5 MG: 1 INJECTION, SOLUTION INTRAMUSCULAR; INTRAVENOUS; SUBCUTANEOUS at 15:14

## 2025-01-29 RX ADMIN — CELECOXIB 100 MG: 100 CAPSULE ORAL at 17:06

## 2025-01-29 RX ADMIN — INSULIN LISPRO 2 UNITS: 100 INJECTION, SOLUTION INTRAVENOUS; SUBCUTANEOUS at 17:06

## 2025-01-29 RX ADMIN — CLINDAMYCIN PHOSPHATE 900 MG: 900 INJECTION, SOLUTION INTRAVENOUS at 10:22

## 2025-01-29 RX ADMIN — STANDARDIZED SENNA CONCENTRATE 8.6 MG: 8.6 TABLET ORAL at 14:33

## 2025-01-29 RX ADMIN — HYDROMORPHONE HYDROCHLORIDE 0.2 MG: 0.2 INJECTION, SOLUTION INTRAMUSCULAR; INTRAVENOUS; SUBCUTANEOUS at 12:44

## 2025-01-29 RX ADMIN — DOCUSATE SODIUM 100 MG: 100 CAPSULE, LIQUID FILLED ORAL at 14:33

## 2025-01-29 RX ADMIN — SODIUM CHLORIDE, SODIUM LACTATE, POTASSIUM CHLORIDE, AND CALCIUM CHLORIDE 125 ML/HR: .6; .31; .03; .02 INJECTION, SOLUTION INTRAVENOUS at 09:21

## 2025-01-29 RX ADMIN — SUGAMMADEX 200 MG: 100 INJECTION, SOLUTION INTRAVENOUS at 11:23

## 2025-01-29 RX ADMIN — GABAPENTIN 300 MG: 300 CAPSULE ORAL at 21:14

## 2025-01-29 RX ADMIN — LIDOCAINE HYDROCHLORIDE 50 MG: 10 INJECTION, SOLUTION EPIDURAL; INFILTRATION; INTRACAUDAL; PERINEURAL at 09:59

## 2025-01-29 RX ADMIN — OXYCODONE HYDROCHLORIDE 5 MG: 5 TABLET ORAL at 14:36

## 2025-01-29 RX ADMIN — GABAPENTIN 300 MG: 300 CAPSULE ORAL at 14:33

## 2025-01-29 RX ADMIN — PROPOFOL 50 MCG/KG/MIN: 10 INJECTION, EMULSION INTRAVENOUS at 10:15

## 2025-01-29 RX ADMIN — SODIUM CHLORIDE: 9 INJECTION, SOLUTION INTRAVENOUS at 10:07

## 2025-01-29 RX ADMIN — FENTANYL CITRATE 50 MCG: 50 INJECTION INTRAMUSCULAR; INTRAVENOUS at 12:09

## 2025-01-29 RX ADMIN — HYDROMORPHONE HYDROCHLORIDE 0.2 MG: 0.2 INJECTION, SOLUTION INTRAMUSCULAR; INTRAVENOUS; SUBCUTANEOUS at 12:19

## 2025-01-29 RX ADMIN — ALBUTEROL SULFATE 2 PUFF: 90 AEROSOL, METERED RESPIRATORY (INHALATION) at 21:14

## 2025-01-29 RX ADMIN — ACETAMINOPHEN 975 MG: 325 TABLET, FILM COATED ORAL at 14:33

## 2025-01-29 RX ADMIN — ONDANSETRON 4 MG: 2 INJECTION INTRAMUSCULAR; INTRAVENOUS at 11:07

## 2025-01-29 RX ADMIN — SODIUM CHLORIDE, SODIUM LACTATE, POTASSIUM CHLORIDE, AND CALCIUM CHLORIDE 60 ML/HR: .6; .31; .03; .02 INJECTION, SOLUTION INTRAVENOUS at 14:28

## 2025-01-29 RX ADMIN — FENTANYL CITRATE 50 MCG: 50 INJECTION INTRAMUSCULAR; INTRAVENOUS at 11:57

## 2025-01-29 RX ADMIN — HEPARIN SODIUM 5000 UNITS: 5000 INJECTION, SOLUTION INTRAVENOUS; SUBCUTANEOUS at 14:33

## 2025-01-29 RX ADMIN — ROCURONIUM BROMIDE 50 MG: 10 INJECTION, SOLUTION INTRAVENOUS at 09:59

## 2025-01-29 RX ADMIN — ACETAMINOPHEN 975 MG: 325 TABLET, FILM COATED ORAL at 08:54

## 2025-01-29 RX ADMIN — SERTRALINE HYDROCHLORIDE 25 MG: 25 TABLET ORAL at 14:33

## 2025-01-29 RX ADMIN — PROPOFOL 300 MG: 10 INJECTION, EMULSION INTRAVENOUS at 09:59

## 2025-01-29 RX ADMIN — FENTANYL CITRATE 100 MCG: 50 INJECTION INTRAMUSCULAR; INTRAVENOUS at 09:59

## 2025-01-29 RX ADMIN — OXYCODONE HYDROCHLORIDE 2.5 MG: 5 TABLET ORAL at 20:39

## 2025-01-29 RX ADMIN — CELECOXIB 200 MG: 200 CAPSULE ORAL at 08:54

## 2025-01-29 RX ADMIN — ACETAMINOPHEN 975 MG: 325 TABLET, FILM COATED ORAL at 21:14

## 2025-01-29 RX ADMIN — MIDAZOLAM 2 MG: 1 INJECTION INTRAMUSCULAR; INTRAVENOUS at 09:56

## 2025-01-29 RX ADMIN — HEPARIN SODIUM 5000 UNITS: 5000 INJECTION INTRAVENOUS; SUBCUTANEOUS at 09:21

## 2025-01-29 RX ADMIN — DEXAMETHASONE SODIUM PHOSPHATE 10 MG: 10 INJECTION, SOLUTION INTRAMUSCULAR; INTRAVENOUS at 10:28

## 2025-01-29 RX ADMIN — FAMOTIDINE 20 MG: 20 TABLET, FILM COATED ORAL at 14:33

## 2025-01-29 NOTE — RESPIRATORY THERAPY NOTE
RT Protocol Note  Efrain Rowleydo 44 y.o. male MRN: [id number]  Unit/Bed#: [facility] 501-01 Encounter: [id number]    Assessment    Principal Problem:    Interstitial lung disease (HCC)      Home Pulmonary Medications:  Albuterol       Past Medical History:   Diagnosis Date    Allergic     Asthma     COPD (chronic obstructive pulmonary disease) (HCC)     Diabetes mellitus (HCC)     Epilepsy (HCC)     HL (hearing loss)     Hypertension     Visual impairment      Social History     Socioeconomic History    Marital status: /Civil Union     Spouse name: None    Number of children: 5    Years of education: None    Highest education level: High school graduate   Occupational History     Comment: Supervisor at a [employer]   Tobacco Use    Smoking status: Former     Average packs/day: 1 pack/day for 30.0 years (30.0 ttl pk-yrs)     Types: Cigarettes     Start date: 01/2025     Passive exposure: Past    Smokeless tobacco: Never   Vaping Use    Vaping status: Former   Substance and Sexual Activity    Alcohol use: Not Currently    Drug use: Never    Sexual activity: Yes     Partners: Female     Birth control/protection: Surgical   Other Topics Concern    None   Social History Narrative    None     Social Drivers of Health     Financial Resource Strain: Patient Declined (9/15/2024)    Received from [facility]    Overall Financial Resource Strain (CARDIA)     Difficulty of Paying Living Expenses: Patient declined   Food Insecurity: Patient Declined (9/15/2024)    Received from [facility]    Hunger Vital Sign     Worried About Running Out of Food in the Last Year: Patient declined     Ran Out of Food in the Last Year: Patient declined   Transportation Needs: Patient Declined (9/15/2024)    Received from [facility]    PRAPARE - Transportation     Lack of Transportation (Medical): Patient declined     Lack of Transportation (Non-Medical): Patient declined   Physical  "Activity: Not on file   Stress: Patient Declined (9/15/2024)    Received from [facility]    [place] [employer] - Occupational Stress Questionnaire     Feeling of Stress : Patient declined   Social Connections: Unknown (9/15/2024)    Received from [facility]    OASIS : Social Isolation     How often do you feel lonely or isolated from those around you?: Patient declines to respond   Intimate Partner Violence: Not At Risk (9/15/2024)    Received from [facility], [facility]    Humiliation, Afraid, Rape, and Kick questionnaire     Fear of Current or Ex-Partner: No     Emotionally Abused: No     Physically Abused: No     Sexually Abused: No   Housing Stability: Patient Declined (9/15/2024)    Received from [facility]    Housing Stability Vital Sign     Unable to Pay for Housing in the Last Year: Patient declined     Number of Times Moved in the Last Year: Not on file     Homeless in the Last Year: Patient declined       Subjective         Objective    Physical Exam:   Assessment Type: (P) Assess only  General Appearance: (P) Awake, Alert  Respiratory Pattern: (P) Normal  Chest Assessment: (P) Chest expansion symmetrical  Bilateral Breath Sounds: (P) Clear    Vitals:  Blood pressure 125/72, pulse 63, temperature 98 °F (36.7 °C), resp. rate 21, height 5' 8\" (1.727 m), weight 95.3 kg (210 lb), SpO2 99%.          Imaging and other studies: Results Review Statement: No pertinent imaging studies reviewed.          Plan             Resp Comments: (P) pt assessed at this time for rcp and acp. pt has hx of COPD and takes albuterol for home use prn. pt states he uses albuterol mdi qhs. will order as such.  pt s/p vats procedure. pt instructed on use of IS.  pt aware to continue use 10x/hr while awake.   "

## 2025-01-29 NOTE — ANESTHESIA POSTPROCEDURE EVALUATION
Post-Op Assessment Note    Last Filed PACU Vitals:  Vitals Value Taken Time   Temp 98 °F (36.7 °C) 01/29/25 1315   Pulse 58 01/29/25 1403   /58 01/29/25 1400   Resp 19 01/29/25 1402   SpO2 99 % 01/29/25 1403   Vitals shown include unfiled device data.    Modified Elizabeth:     Vitals Value Taken Time   Activity 2 01/29/25 1330   Respiration 2 01/29/25 1330   Circulation 1 01/29/25 1330   Consciousness 2 01/29/25 1330   Oxygen Saturation 1 01/29/25 1330     Modified Elizabeth Score: 8

## 2025-01-29 NOTE — OP NOTE
OPERATIVE REPORT  PATIENT NAME: Efrain Fair    :  1980  MRN: [id number]  Pt Location: BE OR ROOM 08    SURGERY DATE: 2025    Surgeons and Role:     * Thien Gutierrez, DO - Primary     * Ale Mcduffie PA-C     * Tay Tejada MD    Preop Diagnosis:  Interstitial lung disease (HCC) [J84.9]    Post-Op Diagnosis Codes:     * Interstitial lung disease (HCC) [J84.9]    Procedure(s):  Bronchoscopy  Right VATS diagnostic wedge biopsy x3  Exparel intercostal blocks    Specimen(s):  ID Type Source Tests Collected by Time Destination   1 : RIGHT UPPER LOBE WEDGE Tissue Lung, Right Lower Lobe TISSUE EXAM Thien Gutierrez, DO 2025 1042    2 : RIGHT LOWER LOBE WEDGE Tissue Lung, Right Lower Lobe TISSUE EXAM Thien Gutierrez, DO 2025 1051    3 : RIGHT MIDDLE LOBE WEDGE Tissue Lung, Right Middle Lobe TISSUE EXAM Thien Gutierrez, DO 2025 1055    A : RIGHT UPPER LOBE WEDGE Tissue Lung, Right Upper Lobe ANAEROBIC CULTURE AND GRAM STAIN, FUNGAL CULTURE, CULTURE, TISSUE AND GRAM STAIN, AFB CULTURE WITH STAIN Thien Gutierrez, DO 2025 1045    B : RIGHT LOWER LOBE WEDGE Tissue Lung, Right Lower Lobe ANAEROBIC CULTURE AND GRAM STAIN, FUNGAL CULTURE, VIRUS CULTURE, CULTURE, TISSUE AND GRAM STAIN, AFB CULTURE WITH STAIN Thien Gutierrez, DO 2025 1052    C : RIGHT MIDDLE LOBE WEDGE Tissue Lung, Right Middle Lobe ANAEROBIC CULTURE AND GRAM STAIN, FUNGAL CULTURE, VIRUS CULTURE, CULTURE, TISSUE AND GRAM STAIN, AFB CULTURE WITH STAIN Thien Gutierrez, DO 2025 1056        Estimated Blood Loss:   Minimal    Drains:  Chest Tube 1 Right Pleural 24 Fr. (Active)   Number of days: 0       Anesthesia Type:   General    Operative Indications:  Interstitial lung disease (HCC) [J84.9]      Operative Findings:        Complications:   None    Procedure and Technique:  The patient was taken to the operating room where name, laterality and procedure were  confirmed. The patient was moved to the operating room table, SCDs were placed, anesthesia was induced and patient was intubated with a double lumen endotracheal tube. Placement was personally confirmed with a pediatric bronchoscope. Next the patient was positioned in the standard lateral decubitus fashion. The patient was prepped with chloraprep and draped in the routine manner. A timeout was called and complete.     I started with a 10mm camera port through the midaxillary line in about the 7th interspace. I then made a 3cm anterior axillary line working port in the 5th interspace. A wound protector was placed here and I assessed the lungs. They were abnormal in appearance throughout the three lobes. Three wedge biopsies were performed containing abnormal and normal lung parenchyma. These biopsies were of the upper lobe, the middle lobe and the lower lobe. After this was complete I performed an Exaperal intercostal block. A portion of each wege was sent for cultures. Paravertebral blocks were placed with 20 ml of 0.25% Marcaine, 20 ml Exparel, and 20 ml NS over 8 levels.      The camera site and access site were confirmed to be hemostatic. A 24Fr chest tube was placed and the lung was insufflated. The chest tube was secured with 0 prolene and an 0 prolene U-stitch was placed. The anterior access incision was closed with 0 vicryl, 2-0 vicryl and 4-0 monocryl. Surgical glue was applied to all sites.       SHANE Mcduffie was necessary to assist in the surgery. Her roles included managing the thoracoscope, retraction and closure.    Dr. Tay Tejada was present as well and assisted with the biopsies and the closure.      I was present for the entire procedure.    Patient Disposition:  PACU              SIGNATURE: Thien DO Matt  DATE: January 29, 2025  TIME: 11:21 AM

## 2025-01-29 NOTE — QUICK NOTE
Thoracic Surgery Post-Op Check  Efrain Fair 44 y.o. male MRN: [id number]  Unit/Bed#: [facility] 501-01 Encounter: [id number]     S: Patient seen and evaluated at bedside after arrival to floor.  Patient reports some pain in his anterior right ribs, worse with deep breathing.  He is tolerating his diet.  He has not yet been out of bed or use the bathroom since surgery.  He is using his incentive spirometer to 1750.  He denies nausea, vomiting, chest pain, shortness of breath, fevers, chills.    R CT (-2, -AL): 60 cc light sanguinous    O:   Vitals:    01/29/25 1330   BP: 125/72   Pulse: 63   Resp: 21   Temp:    SpO2: 99%     I/O         01/27 0701 01/28 0700 01/28 0701 01/29 0700 01/29 0701 01/30 0700    I.V. (mL/kg)   750 (7.9)    Total Intake(mL/kg)   750 (7.9)    Chest Tube   60    Total Output   60    Net   +690                 PE:  Gen:  No acute distress  CV:  Warm, well-perfused  Lung:  Normal work of breathing, no respiratory distress. R CT in place.  Abd:  Soft, non tender, nondistended   Ext:  Moving all extremities  Neuro:  Alert and oriented, M/S grossly intact  Skin:  Incisions and dressings clean, dry, intact     Lab Results   Component Value Date    WBC 6.47 01/29/2025    HGB 13.7 01/29/2025    HCT 40.0 01/29/2025    MCV 91 01/29/2025     (L) 01/29/2025     Lab Results   Component Value Date    CALCIUM 8.5 01/29/2025    K 4.1 01/29/2025    CO2 26 01/29/2025     01/29/2025    BUN 13 01/29/2025    CREATININE 0.81 01/29/2025         A/P: 44 y.o. male * Day of Surgery * s/p Procedure(s) (LRB):  THORACOSCOPY VIDEO ASSISTED SURGERY (VATS) RIGHT LUNG WEDGE RESECTIONS x3 (Right)  BRONCHOSCOPY FLEXIBLE (N/A)    Plan:  Regular diet  Continue R chest tube to -2 waterseal, monitor output  DVT ppx  Out of bed, encourage ambulation  Encourage incentive spirometer use, pulmonary toilet  OOB, ambulate  Strict I's and O's  Pain and nausea control p.r.n.      Saima Neri MD  PGYI, General Surgery

## 2025-01-29 NOTE — ANESTHESIA POSTPROCEDURE EVALUATION
Post-Op Assessment Note    CV Status:  Stable  Pain Score: 0    Pain management: adequate       Mental Status:  Alert and awake   Hydration Status:  Euvolemic   PONV Controlled:  Controlled   Airway Patency:  Patent     Post Op Vitals Reviewed: Yes    No anethesia notable event occurred.    Staff: CRNA           Last Filed PACU Vitals:  Vitals Value Taken Time   Temp 97.5 °F (36.4 °C) 01/29/25 1130   Pulse 78 01/29/25 1131   /76 01/29/25 1131   Resp 20 01/29/25 1131   SpO2 99 % 01/29/25 1131   Vitals shown include unfiled device data.

## 2025-01-29 NOTE — INTERVAL H&P NOTE
H&P reviewed. After examining the patient I find no changes in the patients condition since the H&P had been written.    Vitals:    01/29/25 0842   BP: 100/71   Pulse: 65   Temp: 98.3 °F (36.8 °C)   SpO2: 96%     Has remained off of cigarettes. OR today for VATS diagnostic wedge biopsy.     Thien Gutierrez, DO  Thoracic Surgery

## 2025-01-30 ENCOUNTER — APPOINTMENT (INPATIENT)
Dept: RADIOLOGY | Facility: HOSPITAL | Age: 45
DRG: 168 | End: 2025-01-30
Payer: COMMERCIAL

## 2025-01-30 VITALS
DIASTOLIC BLOOD PRESSURE: 63 MMHG | BODY MASS INDEX: 31.78 KG/M2 | WEIGHT: 209.66 LBS | SYSTOLIC BLOOD PRESSURE: 131 MMHG | RESPIRATION RATE: 20 BRPM | HEART RATE: 62 BPM | HEIGHT: 68 IN | OXYGEN SATURATION: 93 % | TEMPERATURE: 97.9 F

## 2025-01-30 LAB
ANION GAP SERPL CALCULATED.3IONS-SCNC: 9 MMOL/L (ref 4–13)
BUN SERPL-MCNC: 17 MG/DL (ref 5–25)
CALCIUM SERPL-MCNC: 9.4 MG/DL (ref 8.4–10.2)
CHLORIDE SERPL-SCNC: 99 MMOL/L (ref 96–108)
CO2 SERPL-SCNC: 25 MMOL/L (ref 21–32)
CREAT SERPL-MCNC: 0.82 MG/DL (ref 0.6–1.3)
ERYTHROCYTE [DISTWIDTH] IN BLOOD BY AUTOMATED COUNT: 11.7 % (ref 11.6–15.1)
GFR SERPL CREATININE-BSD FRML MDRD: 106 ML/MIN/1.73SQ M
GLUCOSE SERPL-MCNC: 147 MG/DL (ref 65–140)
GLUCOSE SERPL-MCNC: 147 MG/DL (ref 65–140)
GLUCOSE SERPL-MCNC: 161 MG/DL (ref 65–140)
HCT VFR BLD AUTO: 41 % (ref 36.5–49.3)
HGB BLD-MCNC: 13.9 G/DL (ref 12–17)
MAGNESIUM SERPL-MCNC: 2.1 MG/DL (ref 1.9–2.7)
MCH RBC QN AUTO: 31.3 PG (ref 26.8–34.3)
MCHC RBC AUTO-ENTMCNC: 33.9 G/DL (ref 31.4–37.4)
MCV RBC AUTO: 92 FL (ref 82–98)
PLATELET # BLD AUTO: 181 THOUSANDS/UL (ref 149–390)
PMV BLD AUTO: 10 FL (ref 8.9–12.7)
POTASSIUM SERPL-SCNC: 4 MMOL/L (ref 3.5–5.3)
RBC # BLD AUTO: 4.44 MILLION/UL (ref 3.88–5.62)
RHODAMINE-AURAMINE STN SPEC: NORMAL
SODIUM SERPL-SCNC: 133 MMOL/L (ref 135–147)
WBC # BLD AUTO: 10 THOUSAND/UL (ref 4.31–10.16)

## 2025-01-30 PROCEDURE — 85027 COMPLETE CBC AUTOMATED: CPT

## 2025-01-30 PROCEDURE — 97162 PT EVAL MOD COMPLEX 30 MIN: CPT

## 2025-01-30 PROCEDURE — 99232 SBSQ HOSP IP/OBS MODERATE 35: CPT | Performed by: SURGERY

## 2025-01-30 PROCEDURE — 80048 BASIC METABOLIC PNL TOTAL CA: CPT

## 2025-01-30 PROCEDURE — 83735 ASSAY OF MAGNESIUM: CPT

## 2025-01-30 PROCEDURE — 71046 X-RAY EXAM CHEST 2 VIEWS: CPT

## 2025-01-30 PROCEDURE — 82948 REAGENT STRIP/BLOOD GLUCOSE: CPT

## 2025-01-30 PROCEDURE — 97166 OT EVAL MOD COMPLEX 45 MIN: CPT

## 2025-01-30 RX ORDER — SENNOSIDES 8.6 MG
650 CAPSULE ORAL EVERY 6 HOURS
Qty: 28 TABLET | Refills: 0 | Status: SHIPPED | OUTPATIENT
Start: 2025-01-30 | End: 2025-02-06

## 2025-01-30 RX ORDER — GABAPENTIN 300 MG/1
300 CAPSULE ORAL 3 TIMES DAILY
Qty: 63 CAPSULE | Refills: 0 | Status: SHIPPED | OUTPATIENT
Start: 2025-01-30 | End: 2025-02-20

## 2025-01-30 RX ORDER — OXYCODONE HYDROCHLORIDE 5 MG/1
5 TABLET ORAL EVERY 6 HOURS PRN
Qty: 15 TABLET | Refills: 0 | Status: SHIPPED | OUTPATIENT
Start: 2025-01-30 | End: 2025-02-09

## 2025-01-30 RX ORDER — IBUPROFEN 600 MG/1
600 TABLET, FILM COATED ORAL EVERY 8 HOURS SCHEDULED
Qty: 21 TABLET | Refills: 0 | Status: SHIPPED | OUTPATIENT
Start: 2025-01-30 | End: 2025-02-06

## 2025-01-30 RX ADMIN — FAMOTIDINE 20 MG: 20 TABLET, FILM COATED ORAL at 08:17

## 2025-01-30 RX ADMIN — ACETAMINOPHEN 975 MG: 325 TABLET, FILM COATED ORAL at 11:07

## 2025-01-30 RX ADMIN — GABAPENTIN 300 MG: 300 CAPSULE ORAL at 08:17

## 2025-01-30 RX ADMIN — OXYCODONE HYDROCHLORIDE 2.5 MG: 5 TABLET ORAL at 04:54

## 2025-01-30 RX ADMIN — SERTRALINE HYDROCHLORIDE 25 MG: 25 TABLET ORAL at 08:17

## 2025-01-30 RX ADMIN — SODIUM CHLORIDE, SODIUM LACTATE, POTASSIUM CHLORIDE, AND CALCIUM CHLORIDE 60 ML/HR: .6; .31; .03; .02 INJECTION, SOLUTION INTRAVENOUS at 05:53

## 2025-01-30 RX ADMIN — HEPARIN SODIUM 5000 UNITS: 5000 INJECTION, SOLUTION INTRAVENOUS; SUBCUTANEOUS at 04:53

## 2025-01-30 RX ADMIN — OXYCODONE HYDROCHLORIDE 5 MG: 5 TABLET ORAL at 09:21

## 2025-01-30 RX ADMIN — CELECOXIB 100 MG: 100 CAPSULE ORAL at 08:20

## 2025-01-30 RX ADMIN — ACETAMINOPHEN 975 MG: 325 TABLET, FILM COATED ORAL at 04:54

## 2025-01-30 RX ADMIN — DOCUSATE SODIUM 100 MG: 100 CAPSULE, LIQUID FILLED ORAL at 08:17

## 2025-01-30 RX ADMIN — STANDARDIZED SENNA CONCENTRATE 8.6 MG: 8.6 TABLET ORAL at 08:17

## 2025-01-30 NOTE — ASSESSMENT & PLAN NOTE
S/p R VATS wedge resection x3 on 1/29    GABRIELA DELUCA    R CT: WS, 60cc serosanguinous, -AL    -Regular diet  -Dc IVF  -Plan to pull chest tube and obtain post pull Cxr  -Pian and nausea control as needed  -DVT ppx  -IS  -Tentative plan for d/c home today

## 2025-01-30 NOTE — UTILIZATION REVIEW
NOTIFICATION OF ADMISSION DISCHARGE   This is a Notification of Discharge from [facility]. Please be advised that this patient has been discharge from our facility. Below you will find the admission and discharge date and time including the patient’s disposition.   UTILIZATION REVIEW CONTACT:  [place])  Utilization   Network Utilization Review Department  Phone: [phone] x carefully listen to the prompts. All voicemails are confidential.  Email: [email or web address]     ADMISSION INFORMATION  PRESENTATION DATE: 1/29/2025  7:52 AM  OBERVATION ADMISSION DATE: N/A  INPATIENT ADMISSION DATE: 1/29/25 11:40 AM   DISCHARGE DATE: 1/30/2025  1:04 PM   DISPOSITION:Home/Self Care    Network Utilization Review Department  ATTENTION: Please call with any questions or concerns to [phone] and carefully listen to the prompts so that you are directed to the right person. All voicemails are confidential.   For Discharge needs, contact Care Management DC Support Team at [phone] opt. 2  Send all requests for admission clinical reviews, approved or denied determinations and any other requests to dedicated fax number below belonging to the campus where the patient is receiving treatment. List of dedicated fax numbers for the Facilities:  FACILITY NAME UR FAX NUMBER   ADMISSION DENIALS (Administrative/Medical Necessity) [phone]   DISCHARGE SUPPORT TEAM ([facility]) [phone]   PARENT CHILD HEALTH (Maternity/NICU/Pediatrics) [phone]   [facility] [phone]   [facility] [phone]   [facility] [phone]   [facility] [phone]   [facility] [phone]   [facility] [phone]   General acute hospital [phone]   [facility]  [phone]   [facility] [phone]   [facility] [phone]   [facility] [phone]   [facility] [phone]

## 2025-01-30 NOTE — OCCUPATIONAL THERAPY NOTE
Occupational Therapy Evaluation     Patient Name: Efrain Fair  Today's Date: 1/30/2025  Problem List  Principal Problem:    Interstitial lung disease (HCC)    Past Medical History  Past Medical History:   Diagnosis Date    Allergic     Asthma     COPD (chronic obstructive pulmonary disease) (HCC)     Diabetes mellitus (HCC)     Epilepsy (HCC)     HL (hearing loss)     Hypertension     Visual impairment      Past Surgical History  Past Surgical History:   Procedure Laterality Date    CERVICAL DISC SURGERY  01/08/2000    COLONOSCOPY      TONSILLECTOMY            01/30/25 0831   OT Last Visit   OT Visit Date 01/30/25   Note Type   Note type Evaluation   Pain Assessment   Pain Assessment Tool 0-10   Pain Score 2   Pain Location/Orientation Orientation: Right;Location: Chest   Patient's Stated Pain Goal No pain   Hospital Pain Intervention(s) Repositioned;Ambulation/increased activity;Emotional support   Restrictions/Precautions   Weight Bearing Precautions Per Order No   Other Precautions Pain  (chest tube)   Home Living   Type of Home House   Home Layout Two level;Stairs to enter with rails;Bed/bath upstairs  (2 naveen)   Bathroom Shower/Tub Tub/shower unit   Bathroom Toilet Standard   Bathroom Accessibility Accessible   Prior Function   Level of [place] Independent with ADLs;Independent with functional mobility;Independent with IADLS   Lives With Spouse   Receives Help From Family   IADLs Independent with meal prep;Independent with driving;Independent with medication management   Falls in the last 6 months 0   Vocational Full time employment   Lifestyle   Autonomy pta, pt was I W ADL/IADL, no ad mobility. +    Reciprocal Relationships supportive spouse and family   Service to Others FTE   ADL   Where Assessed Edge of bed   Eating Assistance 6  Modified independent   Grooming Assistance 6  Modified Independent   UB Bathing Assistance 6  Modified Independent   LB Bathing Assistance 5  Supervision/Setup    UB Dressing Assistance 6  Modified independent   LB Dressing Assistance 5  Supervision/Setup   Toileting Assistance  5  Supervision/Setup   Functional Assistance 5  Supervision/Setup   Bed Mobility   Supine to Sit 6  Modified independent   Sit to Supine 6  Modified independent   Additional Comments found and left in bed w all needs in reach   Transfers   Sit to Stand 6  Modified independent   Stand to Sit 6  Modified independent   Additional Comments no AD   Functional Mobility   Functional Mobility 5  Supervision   Additional Comments household distance w/o AD   Balance   Static Sitting Normal   Dynamic Sitting Good   Static Standing Fair +   Dynamic Standing Fair   Ambulatory Fair   Activity Tolerance   Activity Tolerance Patient tolerated treatment well   Medical Staff Made Aware DPT 2' pts med complexity   Nurse Made Aware cleared   RUE Assessment   RUE Assessment WFL   LUE Assessment   LUE Assessment WFL   Hand Function   Gross Motor Coordination Functional   Fine Motor Coordination Functional   Cognition   Overall Cognitive Status WFL   Arousal/Participation Alert;Responsive;Cooperative   Attention Within functional limits   Orientation Level Oriented X4   Memory Within functional limits   Following Commands Follows all commands and directions without difficulty   Assessment   Prognosis Good   Assessment Pt is a 44 y.o. male admitted 1/29/25 w interstitial lung disease. Pt underwent VATS x3 1/29/25. Pod 1 w active OT eval and treat orders. PMH includes  has a past medical history of Allergic, Asthma, COPD (chronic obstructive pulmonary disease) (HCC), Diabetes mellitus (HCC), Epilepsy (HCC), HL (hearing loss), Hypertension, and Visual impairment. Pt lives w spouse  in a  2 sh w 2 naveen tub shower and standard toilet. Pta, pt was independent w/ ADL/IADL and functional mobility, was driving and was not using any DME at baseline. Currently, pt is mod I for ub adl, s for lb adl and completed transfers/fm w s w/o  ad. From OT standpoint, pt is functioning at baseline level and does not appear to require additional acute OT at this time. Discussed pt's comfort with d/c from OT and any concerns with returning to previous environment; pt does not report any at this time. The patient's raw score on the AM-PAC Daily Activity inpatient short form is 23, standardized score is 51.12, greater than 39.4. Patients at this level are likely to benefit from discharge to home. Please refer to the recommendation of the Occupational Therapist for safe discharge planning. From OT perspective, pt should D/C HOME when medically stable. Acute OT no longer rq at this time, D/C OT.   Goals   Patient Goals get better   Discharge Recommendation   Rehab Resource Intensity Level, OT No post-acute rehabilitation needs   AM-PAC Daily Activity Inpatient   Lower Body Dressing 3   Bathing 4   Toileting 4   Upper Body Dressing 4   Grooming 4   Eating 4   Daily Activity Raw Score 23   Daily Activity Standardized Score (Calc for Raw Score >=11) 51.12   AM-PAC Applied Cognition Inpatient   Following a Speech/Presentation 4   Understanding Ordinary Conversation 4   Taking Medications 4   Remembering Where Things Are Placed or Put Away 4   Remembering List of 4-5 Errands 4   Taking Care of Complicated Tasks 4   Applied Cognition Raw Score 24   Applied Cognition Standardized Score 62.21   Modified Hitesh Scale   Modified Hitesh Scale 2   End of Consult   Education Provided Yes   Patient Position at End of Consult Bedside chair;Bed/Chair alarm activated;All needs within reach   Nurse Communication Nurse aware of consult       ANISHA Ramírez, OTR/L

## 2025-01-30 NOTE — QUICK NOTE
01/30/25    Procedure: Chest tube removal    Right chest tube removed in routine fashion without incident. The patient tolerated the procedure well. A dry, sterile dressing was placed. Will check a PA/lateral chest x-ray.     Thopaz #1 and  cord were placed on shelf in soiled utility on PPHP5.    Saima eNri MD

## 2025-01-30 NOTE — UTILIZATION REVIEW
"Initial Clinical Review    Elective Inpatient surgical procedure  Age/Sex: 44 y.o. male  Surgery Date:  1/29/2025   Procedure:   Bronchoscopy  Right VATS diagnostic wedge biopsy x3  Exparel intercostal blocks   Anesthesia: General  Operative Findings:       Procedure and Technique:   \" A wound protector was placed here and I assessed the lungs. They were abnormal in appearance throughout the three lobes. Three wedge biopsies were performed containing abnormal and normal lung parenchyma. These biopsies were of the upper lobe, the middle lobe and the lower lobe. After this was complete I performed an Exaperal intercostal block. A portion of each wege was sent for cultures.\"      POD#1 Progress Note: Pt w/ c/o  exertional/pleuritic chest pain. Tolerating diet, passing flatus. Pulling 2L on IS. Voiding freely and ambulating at baseline. R CT: WS, 60cc serosanguinous, -AL   Plan:   -Regular diet  -Dc IVF  -Plan to pull chest tube and obtain post pull Cxr  -Pian and nausea control as needed  -DVT ppx  -IS  -Tentative plan for d/c home today    Admission Orders: Date/Time/Statement:   Admission Orders (From admission, onward)       Ordered        01/29/25 1139  Inpatient Admission  Once                          Orders Placed This Encounter   Procedures    Inpatient Admission     Standing Status:   Standing     Number of Occurrences:   1     Level of Care:   Med Surg [16]     Estimated length of stay:   Inpatient Only Surgery     Diet: Omari/CHO controlled; consistent carbohydrate diet level 2  Mobility: Ambulate  DVT Prophylaxis: SCD, Heparin SC    Medications/Pain Control:   Scheduled Medications:  acetaminophen, 975 mg, Oral, Q6H  albuterol, 2 puff, Inhalation, HS  atorvastatin, 40 mg, Oral, HS  celecoxib, 100 mg, Oral, BID  docusate sodium, 100 mg, Oral, BID  famotidine, 20 mg, Oral, BID  fluticasone, 2 spray, Nasal, Daily  gabapentin, 300 mg, Oral, TID  heparin (porcine), 5,000 Units, Subcutaneous, Q8H KELVIN  insulin " lispro, 1-6 Units, Subcutaneous, TID AC  nicotine, 14 mg, Transdermal, Daily  senna, 1 tablet, Oral, Daily  sertraline, 25 mg, Oral, Daily      Continuous IV Infusions:  lactated ringers infusion  Rate: 60 mL/hr Dose: 60 mL/hr  Freq: Continuous Route: IV  Last Dose: Stopped (01/30/25 0630)  Start: 01/29/25 1145 End: 01/30/25 0614       PRN Meds:  albuterol, 2 puff, Inhalation, Q6H PRN  HYDROmorphone, 0.5 mg, Intravenous, Q3H PRN 01/29 x 1  labetalol, 10 mg, Intravenous, Q6H PRN  ondansetron, 4 mg, Intravenous, Q6H PRN  oxyCODONE, 2.5 mg, Oral, Q4H PRN 01/29 x 1, 01/30 x 1  oxyCODONE, 5 mg, Oral, Q4H PRN 01/29 x 1, 01/30 x 1  polyethylene glycol, 17 g, Oral, Daily PRN      Vital Signs (last 3 days)       Date/Time Temp Pulse Resp BP MAP (mmHg) SpO2 Calculated FIO2 (%) - Nasal Cannula O2 Flow Rate (L/min) Nasal Cannula O2 Flow Rate (L/min) O2 Device Cardiac (WDL) Patient Position - Orthostatic VS [place] Coma Scale Score Pain    01/30/25 07:38:48 97.9 °F (36.6 °C) 62 -- 131/63 86 93 % -- -- -- -- -- -- -- --    01/30/25 0454 -- -- -- -- -- -- -- -- -- -- -- -- -- 3    01/29/25 2226 97.6 °F (36.4 °C) 70 20 111/59 76 93 % -- -- -- -- -- Lying -- No Pain    01/29/25 2114 -- -- -- -- -- -- -- -- -- -- -- -- -- 2    01/29/25 2039 -- -- -- -- -- -- -- -- -- -- -- -- -- 3    01/29/25 2000 -- -- -- -- -- -- -- -- -- -- -- -- 15 --    01/29/25 15:56:50 97.8 °F (36.6 °C) 69 -- 122/66 85 95 % -- -- -- -- -- -- -- --    01/29/25 1519 -- -- -- -- -- 95 % -- -- -- None (Room air) -- -- 15 8 01/29/25 1436 -- -- -- -- -- -- -- -- -- -- -- -- -- 10 - Worst Possible Pain    01/29/25 1330 -- 63 21 125/72 92 99 % 32 -- 3 L/min Nasal cannula -- -- 15 2    01/29/25 1315 98 °F (36.7 °C) 62 20 110/60 80 99 % -- -- -- -- -- -- -- 2 01/29/25 1300 -- 60 20 119/65 88 99 % 32 -- 3 L/min Nasal cannula -- -- -- 2 01/29/25 1245 -- 62 16 121/69 90 99 % 32 -- 3 L/min Nasal cannula -- -- -- 4    01/29/25 1244 -- -- -- -- -- -- -- -- -- -- --  -- -- 4    01/29/25 1230 -- 59 22 124/63 89 99 % 32 -- 3 L/min Nasal cannula -- -- -- 3    01/29/25 1219 -- -- -- -- -- -- -- -- -- -- -- -- -- 6    01/29/25 1215 -- 63 13 124/67 92 98 % 32 -- 3 L/min Nasal cannula -- -- -- 6    01/29/25 1200 -- 65 20 122/77 95 98 % 32 -- 3 L/min Nasal cannula -- -- -- 6    01/29/25 1157 -- -- -- -- -- -- -- -- -- -- -- -- -- 6    01/29/25 1145 -- 75 19 132/75 99 94 % 32 -- 3 L/min Nasal cannula -- -- 14 No Pain    01/29/25 1130 97.5 °F (36.4 °C) 79 21 134/76 100 98 % -- 6 L/min -- Simple mask WDL -- -- --    01/29/25 0854 -- -- -- -- -- -- -- -- -- -- -- -- -- Med Not Given for Pain - for MAR use only    01/29/25 0845 -- -- -- -- -- -- -- -- -- -- -- -- -- No Pain    01/29/25 0842 98.3 °F (36.8 °C) 65 -- 100/71 -- 96 % -- -- -- None (Room air) -- -- -- --          Weight (last 2 days)       Date/Time Weight    01/30/25 0550 95.1 (209.66)    01/29/25 0845 95.3 (210)            Pertinent Labs/Diagnostic Test Results:   Radiology:  XR chest portable   Final Interpretation by Quincy Julio MD (01/29 1311)      Small lateral right pneumothorax. A right-sided chest tube is in place.            The study was marked in EPIC for immediate notification.            Workstation performed: [id number]         XR chest pa & lateral    (Results Pending)     Cardiology:  No orders to display     GI:  No orders to display           Results from last 7 days   Lab Units 01/30/25  0451 01/29/25  1155   WBC Thousand/uL 10.00 6.47   HEMOGLOBIN g/dL 13.9 13.7   HEMATOCRIT % 41.0 40.0   PLATELETS Thousands/uL 181 142*         Results from last 7 days   Lab Units 01/30/25  0451 01/29/25  1155   SODIUM mmol/L 133* 135   POTASSIUM mmol/L 4.0 4.1   CHLORIDE mmol/L 99 104   CO2 mmol/L 25 26   ANION GAP mmol/L 9 5   BUN mg/dL 17 13   CREATININE mg/dL 0.82 0.81   EGFR ml/min/1.73sq m 106 106   CALCIUM mg/dL 9.4 8.5   MAGNESIUM mg/dL 2.1 1.8*         Results from last 7 days   Lab Units  "01/30/25  0612 01/29/25  2031 01/29/25  1557 01/29/25  1209 01/29/25  0851   POC GLUCOSE mg/dl 147* 215* 198* 108 231*     Results from last 7 days   Lab Units 01/30/25  0451 01/29/25  1155   GLUCOSE RANDOM mg/dL 161* 100             No results found for: \"BETA-HYDROXYBUTYRATE\"                                                                                                                                         Network Utilization Review Department  ATTENTION: Please call with any questions or concerns to [phone] and carefully listen to the prompts so that you are directed to the right person. All voicemails are confidential.   For Discharge needs, contact Care Management DC Support Team at [phone] opt. [phone]  Send all requests for admission clinical reviews, approved or denied determinations and any other requests to dedicated fax number below belonging to the campus where the patient is receiving treatment. List of dedicated fax numbers for the Facilities:  FACILITY NAME UR FAX NUMBER   ADMISSION DENIALS (Administrative/Medical Necessity) [phone]   DISCHARGE SUPPORT TEAM (NETWORK) [phone]   PARENT CHILD HEALTH (Maternity/NICU/Pediatrics) [phone]   [facility] [phone]   [facility] [phone]   [facility] [phone]   [facility] [phone]   [facility] [phone]   [facility] [phone]   [facility] [phone]   [facility] [phone]   [facility] [phone]   [facility] [phone]   [facility] [phone]   [facility] [phone]     "

## 2025-01-30 NOTE — DISCHARGE INSTR - AVS FIRST PAGE
VATS (Video-assisted Thoracoscopic Surgery) wedge resections    You underwent a minimally invasive thoracic surgery. Below are your discharge instructions.     Incision Care:  - Your incisions were closed with stitches underneath of the skin which will dissolve. There is purple surgical glue on top of the incisions. The surgical glue will flake off over the next few weeks. There is a non-dissolvable stitch at your chest tube site which will be removed in the office.   - After your chest tube was removed, a gauze dressing was placed over the incision. This gauze can be removed in 24 hours. After this time you may place some gauze over your chest tube site if it is leaking some fluid.  - You do not need dressings over your other incisions.  Do not apply any cream or ointments to the incisions unless instructed by your surgeon.  - You may shower daily - no soaking in a tub bath. Wash your incisions gently with soap and water and pat dry. Do not scrub the incisions.  - Bruising at your incisions is normal. This will resolve over the next few weeks.     Activity  - No lifting greater than 10lbs until you are evaluated in the office.   - Walking and light activity is encouraged. Recommend you are active during the day and using your Incentive Spirometer when you are sitting for a prolonged period.   - No driving while you are using narcotic pain medications. If you are no longer taking narcotics and considering driving, you must make sure you can quickly react to changes on the road. This can be challenging in the first few weeks after surgery.     Pain:  - Some degree of pain or discomfort after surgery is expected. This pain may become more noticeable after discharge as you start moving around more.   - Your pain regiment includes the following:   - Tylenol 650 mg tablet. Take 1 tablet, every 6 hours for 1 week.    - Gabapentin 300 mg tablet. Take 1 tablet, 3x a day for 3 weeks.   - Ibuprofen 600 mg tablet, every 8  hours for 1 week.    - Oxycodone (Narcotic) 5mg tablet. Take 1 tablet, every 4-6 hours as needed for pain.     Medications:  - Continue on your home medications including any blood thinner and aspirin, as instructed.     Diet:  - You should continue on your normal diet.     Bowel Regiment:  - Constipation after surgery while on narcotic pain medications is normal.  - You should continue taking a bowel regiment while on a narcotic pain medication to keep your bowel movements soft. Your discharge bowel regiment:   - Docusate (Colace) 100mg tablet. Take 1 tablet, twice a day.    - Senna 8.6mg tablet. Take 1 tablet daily.   - If your bowel movements become lose, stop taking the bowel regiment above.     Follow-up:  - You have a scheduled follow-up appointment on: 2/17/2025 at 3:40pm  - Obtain your Chest X-ray prior to your scheduled post-operative appointment.   - A couple of days after discharge our office will call you to check in with how you are recovering at home.     Concerns:  - Call the office for any questions or concerns. Many postoperative issues can be sorted out over the phone.   - Call the office or go to the Emergency Department if you develop a fever greater than 101, persistent chills, persistent nausea/vomiting, worsening or uncontrolled pain, and/or increasing redness or foul smelling drainage from an incision.     Thoracic Surgery Office: [phone]    Dr. William Burfeind, MD Rachael Hart, PA-C Dr. Meredith Harrison, MD Amylyn Mortimer, PA-C Dr. Dustin Manchester, MD Dr. Stephen Dingley, DO

## 2025-01-30 NOTE — PROGRESS NOTES
Progress Note - Thoracic    Name: Efrain Rowleydo 44 y.o. male I MRN: [id number]  Unit/Bed#: [facility] 501-01 I Date of Admission: 1/29/2025   Date of Service: 1/30/2025 I Hospital Day: 1    Assessment & Plan  Interstitial lung disease (HCC)  S/p R VATS wedge resection x3 on 1/29    AFVSS, RA    R CT: WS, 60cc serosanguinous, -AL    -Regular diet  -Dc IVF  -Plan to pull chest tube and obtain post pull Cxr  -Pian and nausea control as needed  -DVT ppx  -IS  -Tentative plan for d/c home today        24 Hour Events : Recovering well post-op  Subjective : Only complaint is exertional/pleuritic chest pain. Tolerating diet, passing flatus. Pulling 2L on IS. Voiding freely and ambulating at baseline.     Objective :  Temp:  [97.5 °F (36.4 °C)-98.3 °F (36.8 °C)] 97.6 °F (36.4 °C)  HR:  [59-79] 70  BP: (100-134)/(59-77) 111/59  Resp:  [13-22] 20  SpO2:  [93 %-99 %] 93 %  O2 Device: None (Room air)  Nasal Cannula O2 Flow Rate (L/min):  [3 L/min] 3 L/min    I/O         01/28 0701  01/29 0700 01/29 0701  01/30 0700    P.O.  360    I.V. (mL/kg)  1740 (18.3)    Total Intake(mL/kg)  2100 (22.1)    Urine (mL/kg/hr)  0    Chest Tube  160    Total Output  160    Net  +1940          Unmeasured Urine Occurrence  1 x          Lines/Drains/Airways       Active Status       Name Placement date Placement time Site Days    Chest Tube 1 Right Pleural 24 Fr. 01/29/25  1100  Pleural  less than 1                  Physical Exam  General: NAD  Skin: Warm, dry, anicteric  HEENT: Normocephalic, atraumatic  CV: RRR  Pulm:  Nonlabored breathing on room air; R CT in place as above and incision C/D/I   Abd: Soft, ND/NT  MSK: Symmetric, no edema, no tenderness, no deformity  Neuro: AOx3, GCS 15    Lab Results: I have reviewed the following results:  Recent Labs     01/30/25  0451   WBC 10.00   HGB 13.9   HCT 41.0      SODIUM 133*   K 4.0   CL 99   CO2 25   BUN 17   CREATININE 0.82   GLUC 161*   MG 2.1             VTE Pharmacologic Prophylaxis:  Heparin  VTE Mechanical Prophylaxis: sequential compression device

## 2025-01-30 NOTE — RESTORATIVE TECHNICIAN NOTE
Restorative Technician Note      Patient Name: Efrain Rowleydo     Restorative Tech Visit Date: 01/30/25  Note Type: Mobility  Patient Position Upon Consult: Supine  Mobility / Activity Provided: pt ambulating independently  Activity Performed: Ambulated  Assistive Device: Other (Comment) (none)  Patient Position at End of Consult: Supine; All needs within reach

## 2025-01-30 NOTE — PHYSICAL THERAPY NOTE
Physical Therapy Evaluation     Patient's Name: Efrain Fair    Admitting Diagnosis  Interstitial lung disease (HCC) [J84.9]    Problem List  Patient Active Problem List   Diagnosis    Acute right-sided low back pain with right-sided sciatica    Cervical radiculopathy    COPD (chronic obstructive pulmonary disease) (HCC)    Essential hypertension    Hyperlipidemia    Sensory hearing loss, bilateral    Tinnitus of both ears    Tobacco use disorder    Type 2 diabetes mellitus without complication, without long-term current use of insulin (HCC)    Interstitial lung disease (HCC)    Nail discoloration       Past Medical History  Past Medical History:   Diagnosis Date    Allergic     Asthma     COPD (chronic obstructive pulmonary disease) (HCC)     Diabetes mellitus (HCC)     Epilepsy (HCC)     HL (hearing loss)     Hypertension     Visual impairment        Past Surgical History  Past Surgical History:   Procedure Laterality Date    CERVICAL DISC SURGERY  01/08/2000    COLONOSCOPY      TONSILLECTOMY            01/30/25 0830   PT Last Visit   PT Visit Date 01/30/25   Note Type   Note type Evaluation   Pain Assessment   Pain Assessment Tool 0-10   Pain Score 2   Pain Location/Orientation Orientation: Right;Location: Chest   Pain Onset/Description Onset: Ongoing;Frequency: Constant/Continuous;Descriptor: Sore   Effect of Pain on Daily Activities limits comfort and mobility   Patient's Stated Pain Goal No pain   Hospital Pain Intervention(s) Repositioned;Ambulation/increased activity;Emotional support   Restrictions/Precautions   Weight Bearing Precautions Per Order No   Other Precautions Pain;Multiple lines  (CT)   Home Living   Type of Home House   Home Layout Two level;Access;Stairs to enter with rails  (2STE)   Bathroom Shower/Tub Tub/shower unit   Bathroom Toilet Standard   Bathroom Accessibility Accessible   Prior Function   Level of [place]rum Independent with functional mobility;Independent with  IADLS;Independent with ADLs   Lives With Spouse   Receives Help From Family   IADLs Independent with driving;Independent with medication management;Independent with meal prep   Falls in the last 6 months 1 to 4  (x1)   Vocational Full time employment   General   Family/Caregiver Present No   Cognition   Overall Cognitive Status WFL   Arousal/Participation Alert   Orientation Level Oriented X4   Memory Within functional limits   Following Commands Follows all commands and directions without difficulty   Comments Patient pleasant and cooperative   Subjective   Subjective Patient agreeable to PT eval   RUE Assessment   RUE Assessment WFL   LUE Assessment   LUE Assessment WFL   RLE Assessment   RLE Assessment WFL   LLE Assessment   LLE Assessment WFL   Bed Mobility   Supine to Sit 6  Modified independent   Sit to Supine 6  Modified independent   Transfers   Sit to Stand 6  Modified independent   Stand to Sit 6  Modified independent   Ambulation/Elevation   Gait pattern WNL   Gait Assistance 6  Modified independent   Additional items Verbal cues   Assistive Device None   Distance 250'   Balance   Static Sitting Normal   Dynamic Sitting Good   Static Standing Good   Dynamic Standing Fair +   Ambulatory Fair   Endurance Deficit   Endurance Deficit No   Activity Tolerance   Activity Tolerance Patient tolerated treatment well   Medical Staff Made Aware OT, OTS   Nurse Made Aware RN cleared   Assessment   Prognosis Good   Assessment Pt is a 44 y.o. male seen for a moderate complexity PT evaluation due to Ongoing medical management for primary dx, s/p surgical intervention. Patient is s/p admit to [facility] on 1/29/2025 for Interstitial lung disease (HCC) (J84.9). Patient  has a past medical history of Allergic, Asthma, COPD (chronic obstructive pulmonary disease) (HCC), Diabetes mellitus (HCC), Epilepsy (HCC), HL (hearing loss), Hypertension, and Visual impairment..     PT now consulted to assess functional  mobility and needs for safe d/c planning. pt independent with functional mobility, independent ADLs, and independent IADLs. Personal factors affecting status include hx of falls, steps to enter home, steps to negotiate within home, and medical status.     Currently pt requires modified independent for bed mobility, modified independent for functional transfers with no AD ; modified independent for ambulation with no AD. Pt presents functioning at or near baseline level of function. Patient states no concerns with functional mobility at present. Patient is encouraged to continue ambulating with staff assist as able in order to maintain current LOF.     The patient's AM-PAC Basic Mobility Inpatient Short Form Raw Score Is 24. PT is currently recommending no post acute rehab needs on d/c from hospital. Due to patient current status, plan to sign off formal inpatient PT services at this time. Please re-consult should current status change.   Barriers to Discharge None   Goals   Patient Goals to go home   PT Treatment Day 0   Plan   Treatment/Interventions   (d/c PT)   PT Frequency   (d/c PT)   Discharge Recommendation   Rehab Resource Intensity Level, PT No post-acute rehabilitation needs   AM-PAC Basic Mobility Inpatient   Turning in Flat Bed Without Bedrails 4   Lying on Back to Sitting on Edge of Flat Bed Without Bedrails 4   Moving Bed to Chair 4   Standing Up From Chair Using Arms 4   Walk in Room 4   Climb 3-5 Stairs With Railing 4   Basic Mobility Inpatient Raw Score 24   Basic Mobility Standardized Score 57.68   [facility] Highest Level Of Mobility   -HLM Goal 8: Walk 250 feet or more   -HLM Achieved 8: Walk 250 feet ot more   Modified [place] Scale   Modified [place] Scale 2   End of Consult   Patient Position at End of Consult All needs within reach;Supine         Masha Taylor, PT, DPT

## 2025-01-31 ENCOUNTER — TRANSITIONAL CARE MANAGEMENT (OUTPATIENT)
Dept: FAMILY MEDICINE CLINIC | Facility: CLINIC | Age: 45
End: 2025-01-31

## 2025-01-31 ENCOUNTER — TELEPHONE (OUTPATIENT)
Dept: FAMILY MEDICINE CLINIC | Facility: CLINIC | Age: 45
End: 2025-01-31

## 2025-02-01 LAB
BACTERIA SPEC ANAEROBE CULT: NO GROWTH
BACTERIA TISS AEROBE CULT: NO GROWTH
GRAM STN SPEC: NORMAL

## 2025-02-03 LAB
FUNGUS SPEC CULT: NORMAL

## 2025-02-04 ENCOUNTER — OFFICE VISIT (OUTPATIENT)
Dept: FAMILY MEDICINE CLINIC | Facility: CLINIC | Age: 45
End: 2025-02-04
Payer: COMMERCIAL

## 2025-02-04 VITALS
OXYGEN SATURATION: 96 % | DIASTOLIC BLOOD PRESSURE: 60 MMHG | SYSTOLIC BLOOD PRESSURE: 120 MMHG | WEIGHT: 214 LBS | RESPIRATION RATE: 18 BRPM | TEMPERATURE: 97.5 F | HEART RATE: 64 BPM | HEIGHT: 68 IN | BODY MASS INDEX: 32.43 KG/M2

## 2025-02-04 DIAGNOSIS — I10 ESSENTIAL HYPERTENSION: ICD-10-CM

## 2025-02-04 DIAGNOSIS — J84.9 INTERSTITIAL LUNG DISEASE (HCC): ICD-10-CM

## 2025-02-04 DIAGNOSIS — J44.9 CHRONIC OBSTRUCTIVE PULMONARY DISEASE, UNSPECIFIED COPD TYPE (HCC): Primary | ICD-10-CM

## 2025-02-04 LAB
MYCOBACTERIUM SPEC CULT: NORMAL
RHODAMINE-AURAMINE STN SPEC: NORMAL

## 2025-02-04 PROCEDURE — 99496 TRANSJ CARE MGMT HIGH F2F 7D: CPT | Performed by: NURSE PRACTITIONER

## 2025-02-04 NOTE — PROGRESS NOTES
Transition of Care Visit  Name: Efrain Fair      : 1980      MRN: [id number]  Encounter Provider: NANDINI Rojas  Encounter Date: 2025   Encounter department: [facility]    Assessment & Plan  Chronic obstructive pulmonary disease, unspecified COPD type (HCC)  Stable  Cont meds           Interstitial lung disease (HCC)  Stable  Cont f/u with pulmonary           Essential hypertension  Stable  Cont meds           BMI Counseling: Body mass index is 32.54 kg/m². The BMI is above normal. Exercise recommendations include exercising 3-5 times per week and strength training exercises.     Depression Screening and Follow-up Plan: Patient was screened for depression during today's encounter. They screened negative with a PHQ-2 score of 2.        History of Present Illness     Transitional Care Management Review:   Efrain Fair is a 44 y.o. male here for TCM follow up.     During the TCM phone call patient stated:  TCM Call       Date and time call was made  2025  8:44 AM    Patient was hospitialized at  [facility]    Date of Admission  25    Date of discharge  25    Diagnosis  Interstitial lung disease    Disposition  Home    Were the patients medications reviewed and updated  Yes          TCM Call       Should patient be enrolled in anticoag monitoring?  No    Scheduled for follow up?  Yes    Did you obtain your prescribed medications  Yes    Do you need help managing your prescriptions or medications  No    Is transportation to your appointment needed  No    I have advised the patient to call PCP with any new or worsening symptoms  Michelle Marks,     Living Arrangements  Spouse or Significiant other    Are you recieving any outpatient services  No    Are you recieving home care services  No    Are you using any community resources  No    Current waiver services  No    Have you fallen in the last 12 months  No    Interperter language line needed   "No          Patient here for hospital follow up for post biopsy for ILD  Had right lung wedge resection, biopsy and flexible bronchoscopy    Was given tylenol, gabapentin and ibuprofen for pain s/p biopsy  Now having pain still  When sneezes, cough has pain to the right chest wall  Using oxycodone on and off they prescribed    Stopped vaping and smoking on jan 17, 2025             Review of Systems   Constitutional:  Negative for fatigue and fever.   HENT:  Negative for congestion, rhinorrhea and sinus pain.    Cardiovascular:  Negative for chest pain and palpitations.   Gastrointestinal:  Negative for constipation, diarrhea, nausea and vomiting.   Genitourinary:  Negative for dysuria and frequency.   Musculoskeletal:  Negative for arthralgias and myalgias.   Skin:  Negative for rash.   Neurological:  Negative for dizziness and headaches.   Hematological:  Negative for adenopathy.   Psychiatric/Behavioral:  Negative for dysphoric mood and sleep disturbance. The patient is not nervous/anxious.      Objective   /60 (BP Location: Left arm, Patient Position: Sitting, Cuff Size: Large)   Pulse 64   Temp 97.5 °F (36.4 °C) (Tympanic)   Resp 18   Ht 5' 8\" (1.727 m)   Wt 97.1 kg (214 lb)   SpO2 96%   BMI 32.54 kg/m²     Physical Exam  Vitals and nursing note reviewed.   Constitutional:       Appearance: Normal appearance.   HENT:      Head: Normocephalic and atraumatic.      Right Ear: Tympanic membrane, ear canal and external ear normal.      Left Ear: Tympanic membrane, ear canal and external ear normal.      Nose: Nose normal.      Mouth/Throat:      Mouth: Mucous membranes are moist.   Eyes:      Conjunctiva/sclera: Conjunctivae normal.   Cardiovascular:      Rate and Rhythm: Normal rate and regular rhythm.      Heart sounds: Normal heart sounds.   Pulmonary:      Effort: Pulmonary effort is normal.      Breath sounds: Normal breath sounds.   Abdominal:      General: Bowel sounds are normal. "   Musculoskeletal:         General: Normal range of motion.      Cervical back: Normal range of motion and neck supple.   Skin:     General: Skin is warm and dry.      Capillary Refill: Capillary refill takes less than 2 seconds.   Neurological:      General: No focal deficit present.      Mental Status: He is alert and oriented to person, place, and time.   Psychiatric:         Mood and Affect: Mood normal.         Behavior: Behavior normal.         Thought Content: Thought content normal.         Judgment: Judgment normal.     BMI Counseling: Body mass index is 32.54 kg/m². The BMI is above normal. Nutrition recommendations include reducing portion sizes, decreasing overall calorie intake, 3-5 servings of fruits/vegetables daily, reducing fast food intake, consuming healthier snacks, decreasing soda and/or juice intake, moderation in carbohydrate intake, increasing intake of lean protein, reducing intake of saturated fat and trans fat, and reducing intake of cholesterol. Exercise recommendations include moderate aerobic physical activity for 150 minutes/week, exercising 3-5 times per week, and strength training exercises.   Medications have been reviewed by provider in current encounter

## 2025-02-06 ENCOUNTER — OFFICE VISIT (OUTPATIENT)
Dept: PODIATRY | Facility: CLINIC | Age: 45
End: 2025-02-06
Payer: COMMERCIAL

## 2025-02-06 VITALS — WEIGHT: 214 LBS | HEIGHT: 68 IN | BODY MASS INDEX: 32.43 KG/M2 | RESPIRATION RATE: 18 BRPM

## 2025-02-06 DIAGNOSIS — B35.3 TINEA PEDIS OF BOTH FEET: ICD-10-CM

## 2025-02-06 DIAGNOSIS — B35.1 ONYCHOMYCOSIS: Primary | ICD-10-CM

## 2025-02-06 DIAGNOSIS — L60.8 NAIL DISCOLORATION: ICD-10-CM

## 2025-02-06 DIAGNOSIS — E11.9 CONTROLLED TYPE 2 DIABETES MELLITUS WITHOUT COMPLICATION, WITHOUT LONG-TERM CURRENT USE OF INSULIN (HCC): ICD-10-CM

## 2025-02-06 PROCEDURE — 99243 OFF/OP CNSLTJ NEW/EST LOW 30: CPT | Performed by: PODIATRIST

## 2025-02-06 NOTE — PROGRESS NOTES
Name: Efrain Fair      : 1980      MRN: [id number]  Encounter Provider: Waldo Aranda DPM  Encounter Date: 2025   Encounter department: [facility] [place]    Discussed principles of diabetic footcare.  Vascular status is within normal limits and sensorium is intact.  Urged patient to refrain from walking barefoot.    Patient has multiple mycotic toenails.  This includes each great toenail, the left third toenail and each fifth nail.  They are asymptomatic.  He also has dry scaly skin on the soles of both feet consistent with chronic tinea.  Discussed treatment options.  This patient takes a statin and if he is interested in oral Lamisil and will need to be pulsed dosing.  Due to paucity of symptoms, patient desires no treatment at this time.  He was advised to utilize topical Lotrimin AF on the soles of his feet.  Reappoint as needed  :  Assessment & Plan  Nail discoloration    Orders:    Ambulatory Referral to Podiatry    Onychomycosis         Tinea pedis of both feet         Controlled type 2 diabetes mellitus without complication, without long-term current use of insulin ([facility])    Lab Results   Component Value Date    HGBA1C 6.9 (A) 2024                History of Present Illness   HPI  Efrain Fair is a 44 y.o. male who presents for diabetic pedal assessment as well as evaluation of toenails.  Patient notes that his toenails appear discolored especially his left great nail.  He recalls no trauma to the nail.  The toenail is not painful.  There is also discoloration right great toenail and each fifth nail but not as severe as the left hallux nail.    The patient has been diagnosed with diabetes for years.  He denies numbness or tingling in his feet.    I personally reviewed an A1c dated 2024.  It was 6.9.    I personally reviewed an A1c dated 2024.  It was 7.1.      Review of Systems   Respiratory:          Interstitial lung disease   Neurological:  Negative for  "weakness and numbness.   Psychiatric/Behavioral: Negative.                Objective   Resp 18   Ht 5' 8\" (1.727 m)   Wt 97.1 kg (214 lb)   BMI 32.54 kg/m²      Physical Exam  Cardiovascular:      Pulses: no weak pulses.           Dorsalis pedis pulses are 2+ on the right side and 2+ on the left side.        Posterior tibial pulses are 2+ on the right side and 2+ on the left side.   Feet:      Right foot:      Skin integrity: No ulcer, skin breakdown, erythema, warmth, callus or dry skin.      Left foot:      Skin integrity: No ulcer, skin breakdown, erythema, warmth, callus or dry skin.         Diabetic Foot Exam    Patient's shoes and socks removed.    Right Foot/Ankle   Right Foot Inspection  Skin Exam: skin normal and skin intact. No dry skin, no warmth, no callus, no erythema, no maceration, no abnormal color, no pre-ulcer, no ulcer and no callus.     Toe Exam: ROM and strength within normal limits.     Sensory   Vibration: intact  Proprioception: intact  Monofilament testing: intact    Vascular  Capillary refills: < 3 seconds  The right DP pulse is 2+. The right PT pulse is 2+.     Right Toe  - Comprehensive Exam  Ecchymosis: none  Arch: normal  Hammertoes: absent  Claw Toes: absent  Swelling: none   Tenderness: none       Left Foot/Ankle  Left Foot Inspection  Skin Exam: skin normal and skin intact. No dry skin, no warmth, no erythema, no maceration, normal color, no pre-ulcer, no ulcer and no callus.     Toe Exam: ROM and strength within normal limits.     Sensory   Vibration: intact  Proprioception: intact  Monofilament testing: intact    Vascular  Capillary refills: < 3 seconds  The left DP pulse is 2+. The left PT pulse is 2+.     Left Toe  - Comprehensive Exam  Ecchymosis: none  Arch: normal  Hammertoes: absent  Claw toes: absent  Swelling: none   Tenderness: none       Assign Risk Category  No deformity present  No loss of protective sensation  No weak pulses  Risk: 0        "

## 2025-02-10 LAB
FUNGUS SPEC CULT: NORMAL

## 2025-02-11 LAB
MYCOBACTERIUM SPEC CULT: NORMAL
RHODAMINE-AURAMINE STN SPEC: NORMAL

## 2025-02-11 PROCEDURE — 88341 IMHCHEM/IMCYTCHM EA ADD ANTB: CPT | Performed by: PATHOLOGY

## 2025-02-11 PROCEDURE — 88307 TISSUE EXAM BY PATHOLOGIST: CPT | Performed by: PATHOLOGY

## 2025-02-11 PROCEDURE — 88342 IMHCHEM/IMCYTCHM 1ST ANTB: CPT | Performed by: PATHOLOGY

## 2025-02-14 ENCOUNTER — APPOINTMENT (OUTPATIENT)
Dept: RADIOLOGY | Age: 45
End: 2025-02-14
Payer: COMMERCIAL

## 2025-02-14 DIAGNOSIS — J84.9 INTERSTITIAL LUNG DISEASE (HCC): ICD-10-CM

## 2025-02-14 PROCEDURE — 71046 X-RAY EXAM CHEST 2 VIEWS: CPT

## 2025-02-16 DIAGNOSIS — F41.9 ANXIETY AND DEPRESSION: ICD-10-CM

## 2025-02-16 DIAGNOSIS — E11.9 TYPE 2 DIABETES MELLITUS WITHOUT COMPLICATION, WITHOUT LONG-TERM CURRENT USE OF INSULIN (HCC): ICD-10-CM

## 2025-02-16 DIAGNOSIS — F32.A ANXIETY AND DEPRESSION: ICD-10-CM

## 2025-02-16 DIAGNOSIS — E66.9 OBESITY (BMI 30-39.9): ICD-10-CM

## 2025-02-17 ENCOUNTER — OFFICE VISIT (OUTPATIENT)
Dept: CARDIAC SURGERY | Facility: CLINIC | Age: 45
End: 2025-02-17

## 2025-02-17 VITALS
HEART RATE: 77 BPM | WEIGHT: 210.54 LBS | HEIGHT: 68 IN | SYSTOLIC BLOOD PRESSURE: 121 MMHG | BODY MASS INDEX: 31.91 KG/M2 | RESPIRATION RATE: 14 BRPM | DIASTOLIC BLOOD PRESSURE: 80 MMHG | TEMPERATURE: 98.2 F | OXYGEN SATURATION: 98 %

## 2025-02-17 DIAGNOSIS — J84.9 INTERSTITIAL LUNG DISEASE (HCC): Primary | ICD-10-CM

## 2025-02-17 LAB
FUNGUS SPEC CULT: NORMAL

## 2025-02-17 PROCEDURE — 99024 POSTOP FOLLOW-UP VISIT: CPT | Performed by: SURGERY

## 2025-02-17 RX ORDER — SEMAGLUTIDE 0.68 MG/ML
INJECTION, SOLUTION SUBCUTANEOUS
Qty: 3 ML | Refills: 0 | Status: SHIPPED | OUTPATIENT
Start: 2025-02-17

## 2025-02-17 NOTE — ASSESSMENT & PLAN NOTE
44yM former smoker (recently quit) with ILD now s/p 1/29/25 R VATS wedge biopsies.     He has recovered well from surgery. Breathing much better since he stopped smoking.   No restrictions from my perspective. Follow-up with Pulm tomorrow.

## 2025-02-17 NOTE — LETTER
February 17, 2025     Patient: Efrain Fair  YOB: 1980  Date of Visit: 2/17/2025      To Whom it May Concern:    Efrain Fair is under my professional care. Efrain was seen in my office on 2/17/2025. Efrain may return to work on 2/18/25 . He has no restrictions.    If you have any questions or concerns, please don't hesitate to call.         Sincerely,          Thien Gutierrez,         CC: No Recipients

## 2025-02-17 NOTE — PROGRESS NOTES
"Name: Efrain Fair      : 1980      MRN: [id number]  Encounter Provider: Thien Gutierrez DO  Encounter Date: 2025   Encounter department: [facility] [place]  :  Assessment & Plan  Interstitial lung disease (HCC)  44yM former smoker (recently quit) with ILD now s/p 25 R VATS wedge biopsies.     He has recovered well from surgery. Breathing much better since he stopped smoking.   No restrictions from my perspective. Follow-up with Pulm tomorrow.            Thoracic History   Diagnosis: ILD  Procedures/Surgeries: 25 R VATS wedge biopsy x3  Pathology: See below  Adjuvant Therapy: N/a  Problem   Interstitial Lung Disease (Hcc)        History of Present Illness   HPI  Efrain Fair is a 44 y.o. male who initially saw me in consult for ILD.     From my consult note on 25: \"Efrain Fair is a 44 y.o. male with ILD who was referred by Dr. Sy of Pulmonology for a surgical biopsy. His medical history includes DM, COPD, MARBIN, and ILD. He is a current PPD smoker. He was recently started on Chantix. He believes he can quit for surgery. Previously had quit for 10d for neck surgery.\"    He was taken to the OR on  and underwent VATS right wedge resections. He did well and left POD1.  He states he is breathing much better since quitting smoking.       Data:  The following results contain my personal interpretation and summarization.   CTC (24): ILD w/ b/l inflammatory changes  PFTs (10/22/24): FEV1 89% and DLCO 86%  Path (25): Send out to [place]. Smoking related ILD changes.     Review of Systems   Constitutional:  Negative for chills and fever.   HENT:  Negative for trouble swallowing and voice change.    Eyes:  Negative for photophobia and visual disturbance.   Respiratory:  Negative for chest tightness and shortness of breath.    Cardiovascular:  Negative for chest pain and palpitations.   Gastrointestinal:  Negative for abdominal pain, nausea " "and vomiting.   Musculoskeletal:  Negative for back pain and gait problem.   Skin:  Negative for rash and wound.   Neurological:  Negative for dizziness, weakness and light-headedness.   All other systems reviewed and are negative.          Objective   /80 (BP Location: Left arm, Patient Position: Sitting, Cuff Size: Standard)   Pulse 77   Temp 98.2 °F (36.8 °C) (Temporal)   Resp 14   Ht 5' 8\" (1.727 m)   Wt 95.5 kg (210 lb 8.6 oz)   SpO2 98%   BMI 32.01 kg/m²     Pain Screening:  Pain Score:   2  ECOG    Physical Exam  Vitals reviewed.   Constitutional:       General: He is not in acute distress.     Appearance: Normal appearance.   HENT:      Head: Normocephalic and atraumatic.   Cardiovascular:      Rate and Rhythm: Normal rate and regular rhythm.      Pulses: Normal pulses.      Heart sounds: Normal heart sounds.   Pulmonary:      Effort: Pulmonary effort is normal. No respiratory distress.      Breath sounds: Normal breath sounds.   Abdominal:      General: Abdomen is flat.      Palpations: Abdomen is soft.      Tenderness: There is no abdominal tenderness.   Musculoskeletal:      Cervical back: Neck supple.      Right lower leg: No edema.      Left lower leg: No edema.   Lymphadenopathy:      Cervical: No cervical adenopathy.   Skin:     General: Skin is warm.      Comments: Well-healed surgical incisions.    Neurological:      General: No focal deficit present.      Mental Status: He is alert and oriented to person, place, and time. Mental status is at baseline.   Psychiatric:         Mood and Affect: Mood normal.         Behavior: Behavior normal.         Thought Content: Thought content normal.         Judgment: Judgment normal.         Labs:       Pathology: I have reviewed pathology reports described above.      "

## 2025-02-18 ENCOUNTER — TELEPHONE (OUTPATIENT)
Age: 45
End: 2025-02-18

## 2025-02-18 LAB
MYCOBACTERIUM SPEC CULT: NORMAL
RHODAMINE-AURAMINE STN SPEC: NORMAL

## 2025-02-18 NOTE — TELEPHONE ENCOUNTER
Chiqui from [place] calling in, stated that patient has not yet been cleared to go back to work by Dr Gutierrez and is requesting the form be filled out and faxed back so the patient may return. She stated she is sending a copy of the form to right fax. A verbal can be given as well, or if there are any questions please call: [phone], ext [phone]

## 2025-02-24 LAB
FUNGUS SPEC CULT: NORMAL

## 2025-02-25 LAB
MYCOBACTERIUM SPEC CULT: NORMAL
RHODAMINE-AURAMINE STN SPEC: NORMAL

## 2025-03-03 LAB
FUNGUS SPEC CULT: NORMAL

## 2025-03-11 LAB
MYCOBACTERIUM SPEC CULT: NORMAL
RHODAMINE-AURAMINE STN SPEC: NORMAL

## 2025-03-18 LAB
MYCOBACTERIUM SPEC CULT: NORMAL
RHODAMINE-AURAMINE STN SPEC: NORMAL

## 2025-03-28 ENCOUNTER — OFFICE VISIT (OUTPATIENT)
Dept: FAMILY MEDICINE CLINIC | Facility: CLINIC | Age: 45
End: 2025-03-28
Payer: COMMERCIAL

## 2025-03-28 VITALS
HEART RATE: 59 BPM | TEMPERATURE: 97.7 F | BODY MASS INDEX: 32.92 KG/M2 | DIASTOLIC BLOOD PRESSURE: 64 MMHG | HEIGHT: 68 IN | OXYGEN SATURATION: 94 % | SYSTOLIC BLOOD PRESSURE: 110 MMHG | WEIGHT: 217.2 LBS

## 2025-03-28 DIAGNOSIS — F41.9 ANXIETY AND DEPRESSION: ICD-10-CM

## 2025-03-28 DIAGNOSIS — G47.33 OSA (OBSTRUCTIVE SLEEP APNEA): ICD-10-CM

## 2025-03-28 DIAGNOSIS — J84.9 INTERSTITIAL LUNG DISEASE (HCC): ICD-10-CM

## 2025-03-28 DIAGNOSIS — E78.2 MIXED HYPERLIPIDEMIA: ICD-10-CM

## 2025-03-28 DIAGNOSIS — I10 ESSENTIAL HYPERTENSION: Primary | ICD-10-CM

## 2025-03-28 DIAGNOSIS — E11.9 CONTROLLED TYPE 2 DIABETES MELLITUS WITHOUT COMPLICATION, WITHOUT LONG-TERM CURRENT USE OF INSULIN (HCC): ICD-10-CM

## 2025-03-28 DIAGNOSIS — F32.A ANXIETY AND DEPRESSION: ICD-10-CM

## 2025-03-28 LAB — SL AMB POCT HEMOGLOBIN AIC: 6.2 (ref ?–6.5)

## 2025-03-28 PROCEDURE — 99214 OFFICE O/P EST MOD 30 MIN: CPT | Performed by: FAMILY MEDICINE

## 2025-03-28 PROCEDURE — 83036 HEMOGLOBIN GLYCOSYLATED A1C: CPT | Performed by: FAMILY MEDICINE

## 2025-03-28 RX ORDER — ESCITALOPRAM OXALATE 10 MG/1
10 TABLET ORAL DAILY
Qty: 30 TABLET | Refills: 5 | Status: SHIPPED | OUTPATIENT
Start: 2025-03-28 | End: 2025-09-24

## 2025-03-28 RX ORDER — LISINOPRIL 20 MG/1
20 TABLET ORAL DAILY
Qty: 30 TABLET | Refills: 0 | Status: SHIPPED | OUTPATIENT
Start: 2025-03-28

## 2025-03-28 NOTE — PROGRESS NOTES
Name: Efrain Fair      : 1980      MRN: [id number]  Encounter Provider: Vikram Monroe MD  Encounter Date: 3/28/2025   Encounter department: [facility]  :  Assessment & Plan  Controlled type 2 diabetes mellitus without complication, without long-term current use of insulin (HCC)    Lab Results   Component Value Date    HGBA1C 6.2 2025   At goal. On metformin 500 mg BID. Unfortunately, his copay for the GLP agonist increased to 900$ and is unable to continue this. As a result, Efrain has gained weight. In addition, he started consuming calorie densed and processed foods. Will switch to jardiance. Aware of the potential s/e including UTIs, mycotic infections, and frequent urination. Samples provided and is to call with an update in a month     Orders:    POCT hemoglobin A1c    Empagliflozin (JARDIANCE) 10 MG TABS tablet; Take 1 tablet (10 mg total) by mouth daily    Essential hypertension  Bp at goal. Will lower lisinopril from 40--> 20 mg daily and stop HCTZ since we are adding jardiance   Orders:    lisinopril (ZESTRIL) 20 mg tablet; Take 1 tablet (20 mg total) by mouth daily    Mixed hyperlipidemia  Continue Lipitor 40 mg daily          Anxiety and depression  Admits to feeling depressed when he couldn't continue GLP agonist and started regaining the weight. 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 Currently on wellburin. Will add lexapro 10 mg daily    Orders:    escitalopram (LEXAPRO) 10 mg tablet; Take 1 tablet (10 mg total) by mouth daily    Interstitial lung disease (HCC)  Rules out with lung biopsy but does have smoking related fibrosis. Efrain quit smoking in  and has abstained from cigarettes since with the help of chantix.        MARBIN (obstructive sleep apnea)  New diagnosis and still adjusting to the CPAP machine. Care per sleep medicine               History of Present Illness   Wasn't able  "to refill ozempic due to the cost   Initially 24$, then it increased to 100+$ and was quoted 900$ for the last refill  As a result, pt has regained some of the wieight which also affected his mood  Admits to eating more junk foods and sodas   Quit smokingin Jany 17th. 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   Psychiatric/Behavioral:  Positive for dysphoric mood and sleep disturbance.        Objective   /64 (BP Location: Right arm, Patient Position: Sitting, Cuff Size: Large)   Pulse 59   Temp 97.7 °F (36.5 °C) (Temporal)   Ht 5' 8\" (1.727 m)   Wt 98.5 kg (217 lb 3.2 oz)   SpO2 94%   BMI 33.03 kg/m²      Physical Exam  Vitals reviewed.   Cardiovascular:      Rate and Rhythm: Normal rate and regular rhythm.      Heart sounds: No murmur heard.  Pulmonary:      Effort: Pulmonary effort is normal. No respiratory distress.      Breath sounds: Normal breath sounds. No stridor. No wheezing, rhonchi or rales.   Psychiatric:         Mood and Affect: Mood is depressed.     Administrative Statements   I have spent a total time of 35 minutes in caring for this patient on the day of the visit/encounter including Diagnostic results, Risks and benefits of tx options, Instructions for management, Patient and family education, Risk factor reductions, Impressions, Counseling / Coordination of care, Documenting in the medical record, Reviewing/placing orders in the medical record (including tests, medications, and/or procedures), and Obtaining or reviewing history  .  "

## 2025-03-30 NOTE — ASSESSMENT & PLAN NOTE
Bp at goal. Will lower lisinopril from 40--> 20 mg daily and stop HCTZ since we are adding jardiance   Orders:    lisinopril (ZESTRIL) 20 mg tablet; Take 1 tablet (20 mg total) by mouth daily

## 2025-03-30 NOTE — ASSESSMENT & PLAN NOTE
Rules out with lung biopsy but does have smoking related fibrosis. Efrain quit smoking in Jan and has abstained from cigarettes since with the help of chantix.

## 2025-04-20 DIAGNOSIS — I10 ESSENTIAL HYPERTENSION: ICD-10-CM

## 2025-04-20 DIAGNOSIS — F32.A ANXIETY AND DEPRESSION: ICD-10-CM

## 2025-04-20 DIAGNOSIS — F41.9 ANXIETY AND DEPRESSION: ICD-10-CM

## 2025-04-20 RX ORDER — ESCITALOPRAM OXALATE 10 MG/1
10 TABLET ORAL DAILY
Qty: 90 TABLET | Refills: 1 | Status: SHIPPED | OUTPATIENT
Start: 2025-04-20

## 2025-04-20 RX ORDER — LISINOPRIL 20 MG/1
20 TABLET ORAL DAILY
Qty: 90 TABLET | Refills: 1 | Status: SHIPPED | OUTPATIENT
Start: 2025-04-20

## 2025-05-29 DIAGNOSIS — E78.5 HYPERLIPIDEMIA, UNSPECIFIED HYPERLIPIDEMIA TYPE: ICD-10-CM

## 2025-06-02 RX ORDER — ROSUVASTATIN CALCIUM 20 MG/1
20 TABLET, COATED ORAL DAILY
Qty: 100 TABLET | Refills: 2 | Status: SHIPPED | OUTPATIENT
Start: 2025-06-02

## 2025-06-30 ENCOUNTER — OFFICE VISIT (OUTPATIENT)
Dept: FAMILY MEDICINE CLINIC | Facility: CLINIC | Age: 45
End: 2025-06-30
Payer: COMMERCIAL

## 2025-06-30 VITALS
WEIGHT: 230 LBS | HEART RATE: 79 BPM | HEIGHT: 68 IN | DIASTOLIC BLOOD PRESSURE: 78 MMHG | SYSTOLIC BLOOD PRESSURE: 140 MMHG | BODY MASS INDEX: 34.86 KG/M2 | OXYGEN SATURATION: 97 %

## 2025-06-30 DIAGNOSIS — E11.9 TYPE 2 DIABETES MELLITUS WITHOUT COMPLICATION, WITHOUT LONG-TERM CURRENT USE OF INSULIN (HCC): Primary | ICD-10-CM

## 2025-06-30 DIAGNOSIS — I10 ESSENTIAL HYPERTENSION: ICD-10-CM

## 2025-06-30 DIAGNOSIS — F17.200 TOBACCO USE DISORDER: ICD-10-CM

## 2025-06-30 DIAGNOSIS — M54.41 ACUTE RIGHT-SIDED LOW BACK PAIN WITH RIGHT-SIDED SCIATICA: ICD-10-CM

## 2025-06-30 LAB
CREAT UR-MCNC: 72.2 MG/DL
MICROALBUMIN UR-MCNC: 11 MG/L
MICROALBUMIN/CREAT 24H UR: 15 MG/G CREATININE (ref 0–30)
SL AMB POCT HEMOGLOBIN AIC: 7.2 (ref ?–6.5)

## 2025-06-30 PROCEDURE — 99214 OFFICE O/P EST MOD 30 MIN: CPT | Performed by: FAMILY MEDICINE

## 2025-06-30 PROCEDURE — 82570 ASSAY OF URINE CREATININE: CPT | Performed by: FAMILY MEDICINE

## 2025-06-30 PROCEDURE — 83036 HEMOGLOBIN GLYCOSYLATED A1C: CPT | Performed by: FAMILY MEDICINE

## 2025-06-30 PROCEDURE — 82043 UR ALBUMIN QUANTITATIVE: CPT | Performed by: FAMILY MEDICINE

## 2025-06-30 RX ORDER — PHENTERMINE HYDROCHLORIDE 37.5 MG/1
37.5 CAPSULE ORAL EVERY MORNING
Qty: 30 CAPSULE | Refills: 0 | Status: SHIPPED | OUTPATIENT
Start: 2025-06-30

## 2025-06-30 RX ORDER — METFORMIN HYDROCHLORIDE 500 MG/1
1000 TABLET, EXTENDED RELEASE ORAL 2 TIMES DAILY WITH MEALS
Qty: 120 TABLET | Refills: 0 | Status: SHIPPED | OUTPATIENT
Start: 2025-06-30

## 2025-06-30 NOTE — ASSESSMENT & PLAN NOTE
Lab Results   Component Value Date    HGBA1C 7.2 (A) 06/30/2025     7.2 up from 6.2  Started jardiance at the last visit but reports weight gain  Unfortunately, A1c increased and BP elevated  Will d/c jardiance and increase metformin to 1000XR BID   Urine microalbumin collected     Orders:    Albumin / creatinine urine ratio    POCT hemoglobin A1c    Comprehensive metabolic panel; Future    Hemoglobin A1C; Future

## 2025-06-30 NOTE — ASSESSMENT & PLAN NOTE
Chronic lower back pain with scoliosis of the lumbar region  Recommend OMT to help alleviate pain and straighten the spine

## 2025-06-30 NOTE — ASSESSMENT & PLAN NOTE
BP elevated with recent weight gain.   Still acceptable and will continue lisinopril at its current dose     Orders:    Comprehensive metabolic panel; Future    Hemoglobin A1C; Future

## 2025-06-30 NOTE — PROGRESS NOTES
Name: Efrain Fair      : 1980      MRN: [id number]  Encounter Provider: Vikram Monroe MD  Encounter Date: 2025   Encounter department: [facility]  :  Assessment & Plan  Type 2 diabetes mellitus without complication, without long-term current use of insulin ([facility])    Lab Results   Component Value Date    HGBA1C 7.2 (A) 2025     7.2 up from 6.2  Started jardiance at the last visit but reports weight gain  Unfortunately, A1c increased and BP elevated  Will d/c jardiance and increase metformin to 1000XR BID   Urine microalbumin collected     Orders:  •  Albumin / creatinine urine ratio  •  POCT hemoglobin A1c  •  Comprehensive metabolic panel; Future  •  Hemoglobin A1C; Future    Acute right-sided low back pain with right-sided sciatica  Chronic lower back pain with scoliosis of the lumbar region  Recommend OMT to help alleviate pain and straighten the spine        Essential hypertension  BP elevated with recent weight gain.   Still acceptable and will continue lisinopril at its current dose     Orders:  •  Comprehensive metabolic panel; Future  •  Hemoglobin A1C; Future    BMI 34.0-34.9,adult  Started jardiance at the last visit but gained weight   Stopped medication after 1 month  Discussed weight management.   Weight affecting his mood  Will increase metformin from 500 mg BID --> 1000 XR BID   Orders:  •  Comprehensive metabolic panel; Future  •  Hemoglobin A1C; Future    Tobacco use disorder  Quit with the help of chantix but insurance no longer covering. Admits he resumed smoking. 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     Appearance: Normal appearance. He is not ill-appearing.   HENT:      Head: Normocephalic and atraumatic.     Cardiovascular:      Rate and Rhythm: Normal rate and regular rhythm.      Heart sounds: No murmur heard.  Pulmonary:      Effort: Pulmonary effort is normal. No respiratory distress.      Breath sounds: Normal breath sounds. No stridor. No wheezing, rhonchi or rales.     Musculoskeletal:      Lumbar back: Scoliosis present.     Neurological:      Mental Status: He is alert.     "

## 2025-06-30 NOTE — ASSESSMENT & PLAN NOTE
Quit with the help of chantix but insurance no longer covering. Admits he resumed smoking. Is motivated to quit especially with pulmonary disease

## 2025-07-02 ENCOUNTER — TELEPHONE (OUTPATIENT)
Age: 45
End: 2025-07-02

## 2025-07-02 NOTE — TELEPHONE ENCOUNTER
Called [facility] to inform them patient will pay out of pocket for medication.

## 2025-07-02 NOTE — TELEPHONE ENCOUNTER
Patient called in stating he is away that the phentermine is not covered, but will pay for it out of pocket.  Patient stated the his pharmacy said that it has to be called in by the doctor.  Please advise.

## 2025-07-14 ENCOUNTER — PROCEDURE VISIT (OUTPATIENT)
Dept: FAMILY MEDICINE CLINIC | Facility: CLINIC | Age: 45
End: 2025-07-14
Payer: COMMERCIAL

## 2025-07-14 DIAGNOSIS — M99.00 SOMATIC DYSFUNCTION OF HEAD REGION: ICD-10-CM

## 2025-07-14 DIAGNOSIS — M99.01 SOMATIC DYSFUNCTION OF CERVICAL REGION: ICD-10-CM

## 2025-07-14 DIAGNOSIS — M99.03 SOMATIC DYSFUNCTION OF LUMBAR REGION: ICD-10-CM

## 2025-07-14 DIAGNOSIS — M99.04 SOMATIC DYSFUNCTION OF SACRAL REGION: ICD-10-CM

## 2025-07-14 DIAGNOSIS — M99.05 SOMATIC DYSFUNCTION OF PELVIS REGION: ICD-10-CM

## 2025-07-14 DIAGNOSIS — M54.41 ACUTE RIGHT-SIDED LOW BACK PAIN WITH RIGHT-SIDED SCIATICA: Primary | ICD-10-CM

## 2025-07-14 DIAGNOSIS — M99.02 SOMATIC DYSFUNCTION OF THORACIC REGION: ICD-10-CM

## 2025-07-14 PROCEDURE — 98927 OSTEOPATH MANJ 5-6 REGIONS: CPT | Performed by: FAMILY MEDICINE

## 2025-07-14 NOTE — PATIENT INSTRUCTIONS
"Magnesium L-threonate - 250 to 500 mg before bed.       Patient Education     Back exercises   The Basics   Written by the doctors and editors at [facility]   Why do I need to do back exercises? -- Back exercises can help ease back pain and might help prevent future back pain. Long term, it is important to strengthen the muscles in your lower back, buttocks, and belly. These are your \"core muscles.\" Stretching exercises are also important to keep your muscles flexible.  Below are some stretching and strengthening exercises that might help you. Other forms of movement can help ease or prevent back pain, too. For example, some people like to walk, do aerobic exercise, or do yoga or jung chi. The most important thing is to move your body. Your doctor, nurse, or physical therapist can help you find different types of activity that work for you.  What stretching exercises should I do? -- Below are some examples of stretching exercises. Warm up your muscles before stretching to help prevent injury. To warm up, you can walk, jog, or cycle for a few minutes.  Start by repeating each of these stretches 2 to 3 times. Try to hold each stretch for 5 to 10 seconds, and try to do the stretches 2 to 3 times each day. Breathe slowly and deeply as you do the exercises. Never bounce when doing stretches.   Cat-cow stretch (figure 1) - Start on all fours on the floor, with your hands under your shoulders, knees under your hips, and your back flat. First, tuck your chin and tighten your stomach muscles as you round your back (like a \"cat\"). Hold the stretch for about 10 seconds. Rest for a few seconds as you flatten your back. Next, lift your chin and let your belly and lower back sink toward the floor (like a \"cow\"). Hold the stretch for about 10 seconds.   Single knee-to-chest stretches (figure 2) ? While lying on your back, bend your knees with your feet flat on the floor. Pull 1 knee toward your chest until you feel a stretch in your " lower back and buttock area. Lower, and repeat with the other knee. If you have knee problems, pull your knee up by grabbing the back of your thigh instead of the front of your knee. You can also do this exercise by grabbing both knees at the same time.   Lower trunk rotations (figure 3) ? While lying on your back, bend your knees with your feet flat on the floor. Keep your knees and ankles together, and then drop them to 1 side. Keep both of your shoulders touching the floor until you feel a stretch in the muscles at the side of the back. Repeat on the other side.   Lower back stretches seated (figure 4) ? Sit in a chair with your feet spread about shoulder width apart. Then, lean forward until you feel a stretch in your lower back.  What strengthening exercises should I do? -- Below are some examples of strengthening exercises.  Start by doing each exercise 2 to 3 times. Work up to doing each exercise 10 times. Hold each exercise for 3 to 5 seconds, and try to do the exercises 2 to 3 times each day. Do all exercises slowly.   Shoulder blade squeezes (figure 5) ? Pinch your shoulder blades together on your upper back, and hold 3 to 5 seconds. You can also do these 1 side at a time. Sit with good posture, and make sure that your shoulders do not rise up when you do this exercise. Relax.   Pelvic tilts (figure 6) ? Lie on your back with your knees bent and feet flat on the floor. Tighten your stomach muscles, and press your lower back down to the floor. Relax. You should be able to breathe comfortably during this exercise.   Hip lifts (figure 7) ? Lie on your back with your knees bent and feet flat on the floor. Tighten your stomach muscles, keep your back flat, and lift your buttocks off of the floor. Relax. You should feel this in your buttocks, not in your lower back.  What else should I know?    Exercise, including stretching, might be slightly uncomfortable. But you should not have sharp or severe pain. If you  "do get severe pain, stop what you are doing. If severe pain continues, call your doctor or nurse.   Do not hold your breath when exercising. If you tend to hold your breath, try counting out loud when exercising. If any exercise bothers you, stop right away.   Always warm up before exercising. Warm muscles stretch much easier than cool muscles. Stretching cool muscles can lead to injury.   Doing exercises before a meal can be a good way to get into a routine.  All topics are updated as new evidence becomes available and our peer review process is complete.  This topic retrieved from [employer] on: Apr 03, 2024.  Topic 579757 Version 2.0  Release: 32.2.4 - C32.92  © 2024 UpToDate, Inc. and/or its affiliates. All rights reserved.  figure 1: Cat-cow stretch     Start on all fours on the floor, with hands under your shoulders, knees under your hips, and your back flat. First, tuck your chin and tighten your stomach muscles as you round your back (like a \"cat\"). Hold the stretch for about 10 seconds. Rest for a few seconds as you flatten your back. Next, lift your chin and let your belly and lower back sink toward the floor (like a \"cow\"). Hold the stretch for about 10 seconds.  Graphic [id number] Version 1.0  figure 2: Single knee-to-chest stretch     Lie on your back, bend your knees, and have your feet flat on the floor. Pull 1 knee toward your chest until you feel a stretch in your lower back and buttock area. Repeat with the other knee. If you have knee problems, pull your knee up by grabbing the back of your thigh instead of the front of your knee. You can also do this exercise by grabbing both knees at the same time.  Graphic [id number] Version 1.0  figure 3: Lower trunk rotation     While lying on your back, bend your knees and have your feet flat on the floor. Keep your legs together and then drop them to 1 side. Keep both of your shoulders touching the floor until you feel a stretch in the muscles at the side of the " back. Repeat on the other side.  Graphic [id number] Version 1.0  figure 4: Lower back stretch     Sit in a chair with your feet spread about shoulder width apart. Then, lean forward until you feel a stretch in your lower back.  Graphic [id number] Version 1.0  figure 5: Shoulder blade squeezes     Pinch your shoulder blades together on your upper back and hold for 3 to 5 seconds. Make sure that you are sitting with good posture and that your shoulders do not raise up when you do this exercise. Relax.  Graphic [id number] Version 1.0  figure 6: Pelvic tilts     Lie on your back with your knees bent and feet flat on the floor. Tighten your stomach muscles and press your lower back down to the floor. Relax.  Graphic [id number] Version 1.0  figure 7: Hip lifts     Lie on your back with your knees bent and feet flat on the floor. Tighten your stomach muscles and lift your buttocks off of the floor. Relax.  Graphic [id number] Version 1.0  Consumer Information Use and Disclaimer   Disclaimer: This generalized information is a limited summary of diagnosis, treatment, and/or medication information. It is not meant to be comprehensive and should be used as a tool to help the user understand and/or assess potential diagnostic and treatment options. It does NOT include all information about conditions, treatments, medications, side effects, or risks that may apply to a specific patient. It is not intended to be medical advice or a substitute for the medical advice, diagnosis, or treatment of a health care provider based on the health care provider's examination and assessment of a patient's specific and unique circumstances. Patients must speak with a health care provider for complete information about their health, medical questions, and treatment options, including any risks or benefits regarding use of medications. This information does not endorse any treatments or medications as safe, effective, or approved for treating a specific patient.  UpToDate, Inc. and its affiliates disclaim any warranty or liability relating to this information or the use thereof.The use of this information is governed by the Terms of Use, available at https://www.wolterskluwer.com/en/know/clinical-effectiveness-terms. 2024© UpToDate, Inc. and its affiliates and/or licensors. All rights reserved.  Copyright   © 2024 UpToDate, Inc. and/or its affiliates. All rights reserved.

## 2025-07-14 NOTE — PROGRESS NOTES
The Assessment & Plan     This is a 44 y.o. male who presents for OMT follow-up for:  1. Acute right-sided low back pain with right-sided sciatica  OMT      2. Somatic dysfunction of head region  OMT      3. Somatic dysfunction of cervical region  OMT      4. Somatic dysfunction of thoracic region  OMT      5. Somatic dysfunction of lumbar region  OMT      6. Somatic dysfunction of sacral region  OMT      7. Somatic dysfunction of pelvis region  OMT           1. Patient tolerated OMT well for the above problems,  advised patient to drink fluids and can use NSAID for soreness after treatment     2. OMT Follow up in 3 weeks.    Subjective     Efrain Fair is a 44 y.o. male and is here for a OMT and initial evaluation.. The patient reports ongoing lumbar back pain which started 20 years ago.  Has undergone multiple treatments with epidural steroid injections, physical therapy.  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  He does sometimes get radiation of pain to his right side.  Denies any numbness or tingling.  Denies any saddle paresthesia.  No loss of bowel or bladder.  Does have history of MRIs with disc herniation at L3-4.  Does follow with pain and spine.    Is the patient taking Pain medication? no  Has the patient completed physical therapy for this condition? yes  Did Patient symptoms improve from last OMT appointment? Initial visit    The following portions of the patient's history were reviewed and updated as appropriate: allergies, current medications, past family history, past medical history, past social history, past surgical history, and problem list.    Review of Systems  Review of Systems   Constitutional:  Negative for chills and fever.   HENT:  Negative for ear pain and sore throat.    Eyes:  Negative for pain  and visual disturbance.   Respiratory:  Negative for cough and shortness of breath.    Cardiovascular:  Negative for chest pain and palpitations.   Gastrointestinal:  Negative for abdominal pain and vomiting.   Genitourinary:  Negative for dysuria and hematuria.   Musculoskeletal:  Positive for back pain. Negative for arthralgias.   Skin:  Negative for color change and rash.   Neurological:  Negative for seizures and syncope.   Psychiatric/Behavioral:  Negative for confusion and sleep disturbance. The patient is not nervous/anxious.    All other systems reviewed and are negative.        Objective     OMT Exam     OMT    Performed by: Tulio Metcalf DO  Authorized by: Tulio Metcalf DO    Universal Protocol:  procedure performed by consultantConsent: Verbal consent obtained  Consent given by: patient  Patient identity confirmed: verbally with patient      Procedure Details:     Region evaluated and treated:  Head, Thoracic, Lumbar, Sacrum/Pelvis, Pelvis Innominate and Cervical    Thoracic Information  Thoracic Region: T10 - T12 and T5 - T9  Head Details:     Examination Method:  Tissue Texture Change, Stability, Laxity, Effusions, Tone, Range of Motion, Contracture, Asymmetry, Misalignment, Crepitation, Defects, Masses and Tenderness, Pain    Severity:  Moderate    Osteopathic Findings:  OA FSRRL     Treatment Method:  Muscle Energy Treatment, Myofascial Release Treatment and Soft Tissue Treatment    Response:  Improved - The somatic dysfunction is improved but not completely resolved.    Cervical Details:     Examination Method:  Tissue Texture Change, Stability, Laxity, Effusions, Tone, Range of Motion, Contracture, Asymmetry, Misalignment, Crepitation, Defects, Masses and Tenderness, Pain    Severity:  Moderate    Osteopathic Findings:  Bilateral paracervical muscle hypertonicity over right greater than left  Right scalene hypertonicity  Right C3, C7 tender with    Treatment Method:  Counterstrain Treatment,  Muscle Energy Treatment, Myofascial Release Treatment and Soft Tissue Treatment    Response:  Improved - The somatic dysfunction is improved but not completely resolved.    Thoracic T5 - T9 details:     Examination Method:  Tissue Texture Change, Stability, Laxity, Effusions, Tone, Range of Motion, Contracture, Asymmetry, Misalignment, Crepitation, Defects, Masses and Tenderness, Pain    Severity:  Moderate    Osteopathic Findings:  Bilateral paraspinal muscle bruising right greater than left  T7 flexed side bent right rotated right    Treatment Method:  Muscle Energy Treatment, Myofascial Release Treatment and Soft Tissue Treatment    Response:  Improved - The somatic dysfunction is improved but not completely resolved.    Thoracic T10 - T12 details:     Examination Method:  Tissue Texture Change, Stability, Laxity, Effusions, Tone, Range of Motion, Contracture, Asymmetry, Misalignment, Crepitation, Defects, Masses and Tenderness, Pain    Severity:  Moderate    Osteopathic Findings:  Bilateral paraspinal muscle hypertonicity, right greater than left  Right T12 tender point    Treatment Method:  Counterstrain Treatment, Muscle Energy Treatment and Soft Tissue Treatment    Response:  Improved - The somatic dysfunction is improved but not completely resolved.    Lumbar details:     Examination Method:  Tissue Texture Change, Stability, Laxity, Effusions, Tone, Range of Motion, Contracture, Asymmetry, Misalignment, Crepitation, Defects, Masses and Tenderness, Pain    Severity:  Moderate    Osteopathic Findings:  Bilateral paraspinal muscle tenderness to right greater than left  L5 flexed side bent right rotated right with tender point on the right  Right quadratus hypertonicity and tender point  Right psoas hypertonicity and tender point    Treatment Method:  Counterstrain Treatment, Muscle Energy Treatment, Myofascial Release Treatment and Soft Tissue Treatment    Response:  Improved - The somatic dysfunction is  improved but not completely resolved.    Sacrum/Pelvis details:     Examination Method:  Tissue Texture Change, Stability, Laxity, Effusions, Tone, Range of Motion, Contracture, Asymmetry, Misalignment, Crepitation, Defects, Masses and Tenderness, Pain    Severity:  Moderate    Osteopathic Findings:  Right on left sacral torsion    Treatment Method:  Articulatory Treatment, Muscle Energy Treatment and Soft Tissue Treatment    Response:  Improved - The somatic dysfunction is improved but not completely resolved.    Pelvis Innominate details:     Examination Method:  Tissue Texture Change, Stability, Laxity, Effusions, Tone, Range of Motion, Contracture, Asymmetry, Misalignment, Crepitation, Defects, Masses and Tenderness, Pain    Severity:  Moderate    Osteopathic Findings:  Right ASIS posterior innominate    Treatment Method:  Articulatory Treatment, Muscle Energy Treatment and Soft Tissue Treatment    Response:  Improved - The somatic dysfunction is improved but not completely resolved.    Total Regions Treated:  6

## 2025-07-26 DIAGNOSIS — E11.9 TYPE 2 DIABETES MELLITUS WITHOUT COMPLICATION, WITHOUT LONG-TERM CURRENT USE OF INSULIN (HCC): ICD-10-CM

## 2025-07-29 RX ORDER — METFORMIN HYDROCHLORIDE 500 MG/1
1000 TABLET, EXTENDED RELEASE ORAL 2 TIMES DAILY WITH MEALS
Qty: 360 TABLET | Refills: 1 | Status: SHIPPED | OUTPATIENT
Start: 2025-07-29

## 2025-08-02 DIAGNOSIS — E11.9 TYPE 2 DIABETES MELLITUS WITHOUT COMPLICATION, WITHOUT LONG-TERM CURRENT USE OF INSULIN (HCC): ICD-10-CM

## 2025-08-02 DIAGNOSIS — I10 ESSENTIAL HYPERTENSION: ICD-10-CM

## 2025-08-04 RX ORDER — PHENTERMINE HYDROCHLORIDE 37.5 MG/1
37.5 CAPSULE ORAL EVERY MORNING
Qty: 30 CAPSULE | Refills: 0 | Status: SHIPPED | OUTPATIENT
Start: 2025-08-04

## 2025-08-11 ENCOUNTER — PROCEDURE VISIT (OUTPATIENT)
Dept: FAMILY MEDICINE CLINIC | Facility: CLINIC | Age: 45
End: 2025-08-11
Payer: COMMERCIAL

## (undated) DEVICE — INTENDED FOR TISSUE SEPARATION, AND OTHER PROCEDURES THAT REQUIRE A SHARP SURGICAL BLADE TO PUNCTURE OR CUT.: Brand: BARD-PARKER SAFETY BLADES SIZE 10, STERILE

## (undated) DEVICE — EXOFIN PRECISION PEN HIGH VISCOSITY TOPICAL SKIN ADHESIVE: Brand: EXOFIN PRECISION PEN, 1G

## (undated) DEVICE — SPECIMEN CONTAINER STERILE PEEL PACK

## (undated) DEVICE — SUT VICRYL 0 CT-1 27 IN J260H

## (undated) DEVICE — SYRINGE 10ML SLIP TIP LF

## (undated) DEVICE — PACK CUSTOM THORACIC RF

## (undated) DEVICE — TUBING SUCTION 5MM X 12 FT

## (undated) DEVICE — WOUND RETRACTOR AND PROTECTOR: Brand: ALEXIS WOUND PROTECTOR-RETRACTOR

## (undated) DEVICE — SUT VICRYL 2-0 SH 27 IN UNDYED J417H

## (undated) DEVICE — ARTICULATION RELOAD WITH TRI-STAPLE TECHNOLOGY: Brand: ENDO GIA

## (undated) DEVICE — GLOVE SRG BIOGEL ECLIPSE 7.5

## (undated) DEVICE — ARTICULATING RELOAD WITH TRI-STAPLE TECHNOLOGY: Brand: ENDO GIA

## (undated) DEVICE — DRAIN SPONGES,6 PLY: Brand: EXCILON

## (undated) DEVICE — SUT PROLENE 0 CT-1 30 IN 8424H

## (undated) DEVICE — PAD GROUNDING DUAL ADULT

## (undated) DEVICE — ELECTRODE BLADE MOD E-Z CLEAN 2.5IN 6.4CM -0012M

## (undated) DEVICE — SINGLE USE BIOPSY VALVE MAJ-210: Brand: SINGLE USE BIOPSY VALVE (STERILE)

## (undated) DEVICE — NEEDLE 25G X 1 1/2

## (undated) DEVICE — INTENDED FOR TISSUE SEPARATION, AND OTHER PROCEDURES THAT REQUIRE A SHARP SURGICAL BLADE TO PUNCTURE OR CUT.: Brand: BARD-PARKER SAFETY BLADES SIZE 15, STERILE

## (undated) DEVICE — NEEDLE SPINAL 20G X 3.5 LF

## (undated) DEVICE — SINGLE TUBING WITH LARGE CONNECTOR FOR THORACIC SUCTION SYSTEM PUMP: Brand: THOPAZ TUBING SINGLE

## (undated) DEVICE — ENSEAL X1 TISSUE SEALER, CURVED JAW, 37 CM SHAFT LENGTH: Brand: ENSEAL

## (undated) DEVICE — SUT MONOCRYL 4-0 PS-2 18 IN Y496G

## (undated) DEVICE — POWER SHELL: Brand: SIGNIA

## (undated) DEVICE — ANTIBACTERIAL UNDYED BRAIDED (POLYGLACTIN 910), SYNTHETIC ABSORBABLE SUTURE: Brand: COATED VICRYL

## (undated) DEVICE — GAUZE SPONGES,16 PLY: Brand: CURITY

## (undated) DEVICE — FIRST STEP BEDSIDE KIT - STAND-UP POUCH, ENDOSCOPIC CLEANING PAD - 1 POUCH: Brand: FIRST STEP BEDSIDE KIT - STAND-UP POUCH, ENDOSCOPIC CLEANING PAD

## (undated) DEVICE — COBAN 4 IN STERILE

## (undated) DEVICE — CANISTER FOR THORACIC SUCTION SYSTEM: Brand: THOPAZ DISPOSABLE CANISTER 0.8L

## (undated) DEVICE — 24 FR STRAIGHT – SOFT PVC CATHETER: Brand: PVC THORACIC CATHETERS

## (undated) DEVICE — ELECTRODE EXTENDER STD 3/32 CONNECTOR 20 X 10MM STRL

## (undated) DEVICE — ADHESIVE SKIN CLOSURE SYS EXOFIN FUSION 22CM

## (undated) DEVICE — SINGLE USE SUCTION VALVE MAJ-209: Brand: SINGLE USE SUCTION VALVE (STERILE)